# Patient Record
Sex: FEMALE | Race: WHITE | Employment: FULL TIME | URBAN - NONMETROPOLITAN AREA
[De-identification: names, ages, dates, MRNs, and addresses within clinical notes are randomized per-mention and may not be internally consistent; named-entity substitution may affect disease eponyms.]

---

## 2017-04-25 ENCOUNTER — OFFICE VISIT - GICH (OUTPATIENT)
Dept: FAMILY MEDICINE | Facility: OTHER | Age: 27
End: 2017-04-25

## 2017-04-25 ENCOUNTER — HISTORY (OUTPATIENT)
Dept: FAMILY MEDICINE | Facility: OTHER | Age: 27
End: 2017-04-25

## 2017-04-25 DIAGNOSIS — R35.0 FREQUENCY OF MICTURITION: ICD-10-CM

## 2017-04-25 DIAGNOSIS — N30.00 ACUTE CYSTITIS WITHOUT HEMATURIA: ICD-10-CM

## 2017-04-25 LAB
BACTERIA URINE: NORMAL BACTERIA/HPF
BILIRUB UR QL: NEGATIVE
CLARITY, URINE: CLEAR CLARITY
COLOR UR: YELLOW COLOR
EPITHELIAL CELLS: NORMAL EPI/HPF
GLUCOSE URINE: NEGATIVE MG/DL
KETONES UR QL: NEGATIVE MG/DL
LEUKOCYTE ESTERASE URINE: ABNORMAL
NITRITE UR QL STRIP: NEGATIVE
OCCULT BLOOD,URINE - HISTORICAL: ABNORMAL
PH UR: 5.5 [PH]
PROTEIN QUALITATIVE,URINE - HISTORICAL: NEGATIVE MG/DL
RBC - HISTORICAL: NORMAL /HPF
SP GR UR STRIP: 1.01
UROBILINOGEN,QUALITATIVE - HISTORICAL: NORMAL EU/DL
WBC - HISTORICAL: NORMAL /HPF

## 2017-04-28 LAB — CULTURE - HISTORICAL: NORMAL

## 2017-11-08 ENCOUNTER — COMMUNICATION - GICH (OUTPATIENT)
Dept: OBGYN | Facility: OTHER | Age: 27
End: 2017-11-08

## 2017-11-08 DIAGNOSIS — O36.80X0 PREGNANCY WITH INCONCLUSIVE FETAL VIABILITY: ICD-10-CM

## 2017-12-08 ENCOUNTER — AMBULATORY - GICH (OUTPATIENT)
Dept: OBGYN | Facility: OTHER | Age: 27
End: 2017-12-08

## 2017-12-08 ENCOUNTER — HISTORY (OUTPATIENT)
Dept: OBGYN | Facility: OTHER | Age: 27
End: 2017-12-08

## 2017-12-08 ENCOUNTER — HOSPITAL ENCOUNTER (OUTPATIENT)
Dept: RADIOLOGY | Facility: OTHER | Age: 27
End: 2017-12-08

## 2017-12-08 ENCOUNTER — PRENATAL OFFICE VISIT - GICH (OUTPATIENT)
Dept: OBGYN | Facility: OTHER | Age: 27
End: 2017-12-08

## 2017-12-08 DIAGNOSIS — O36.80X0 PREGNANCY WITH INCONCLUSIVE FETAL VIABILITY: ICD-10-CM

## 2017-12-08 DIAGNOSIS — Z34.02 ENCOUNTER FOR SUPERVISION OF NORMAL FIRST PREGNANCY IN SECOND TRIMESTER: ICD-10-CM

## 2017-12-28 NOTE — TELEPHONE ENCOUNTER
Patient Information     Patient Name MRN Romelia Lyman [id number] Female 1990      Telephone Encounter by Jocelin Iglesias RN at 2017  3:52 PM     Author:  Jocelin Iglesias RN Service:  (none) Author Type:  NURS- Registered Nurse     Filed:  2017  3:54 PM Encounter Date:  2017 Status:  Signed     :  Jocelin Iglesias RN (NURS- Registered Nurse)            Patient should be seen in soonest opening, labs and ultrasound are most reliable in early pregnancy. Imaging order placed.  Jocelin Iglesias RN............. 2017 3:53 PM

## 2017-12-28 NOTE — TELEPHONE ENCOUNTER
Patient Information     Patient Name MRN Romelia Lmyan [id number] Female 1990      Telephone Encounter by Ann Patel at 2017  3:44 PM     Author:  Ann Patel Service:  (none) Author Type:  (none)     Filed:  2017  3:46 PM Encounter Date:  2017 Status:  Signed     :  Ann Patel            Patient is scheduled for her initial ob physical with [facility] on 2017 at 11:15am.  Patient is currently 12 weeks pregnant.     Ann Patel ....................  2017   3:45 PM

## 2018-01-02 ENCOUNTER — COMMUNICATION - GICH (OUTPATIENT)
Dept: OBGYN | Facility: OTHER | Age: 28
End: 2018-01-02

## 2018-01-04 NOTE — PROGRESS NOTES
Patient Information     Patient Name MRN Sex Romelia OSMAN [id number] Female 1990      Progress Notes by Nadege Kelley NP at 2017  4:45 PM     Author:  Nadege Kelley NP Service:  (none) Author Type:  PHYS- Nurse Practitioner     Filed:  2017  5:18 PM Encounter Date:  2017 Status:  Signed     :  Nadege Kelley NP (PHYS- Nurse Practitioner)            HPI:    Romelia May is a 27 y.o. female who presents to clinic today for dysuria. She woke up this morning with burning after urination. No hematuria. She does have frequency and urgency. No fevers. Denies n/v or back pain. Took AZO for sx this morning. Hx of UTI's in past. Does not smoke. No vaginal discharge. Denies STD concerns.     No past medical history on file.  Past Surgical History:      Procedure  Laterality Date     WISDOM TEETH EXTRACTION  age 16    no anesthetic problems       Social History     Substance Use Topics       Smoking status: Never Smoker     Smokeless tobacco: Never Used     Alcohol use No     Current Outpatient Prescriptions       Medication  Sig Dispense Refill     albuterol HFA (PRO-AIR,VENTOLIN,PROVENTIL) 90 mcg/actuation inhaler Inhale 2 Puffs by mouth 4 times daily if needed for Wheezing. 1 Inhaler 0     NUVARING vaginal ring INSERT ONE RING VAGINALLY AND LEAVE IN PLACE FOR 3 CONSECUTIVE WEEKS, THEN REMOVE FOR ONE WEEK. REPEAT WITH NEW RING. 1 ring 0     No current facility-administered medications for this visit.      Medications have been reviewed by me and are current to the best of my knowledge and ability.    Allergies     Allergen  Reactions     Penicillins Rash       ROS:  Pertinent positives and negatives are noted in HPI.    EXAM:  General appearance: well appearing female, in no acute distress  Respiratory: clear to auscultation bilaterally  Cardiac: RRR with no murmurs  Abdomen: soft, nontender, no masses or organomegally, no CVA tenderness  Psychological: normal affect, alert and  pleasant  Lab:   Results for orders placed or performed in visit on 04/25/17      URINALYSIS W REFLEX MICROSCOPIC IF POSITIVE      Result  Value Ref Range    COLOR                     Yellow Yellow Color    CLARITY                   Clear Clear Clarity    SPECIFIC GRAVITY,URINE    1.010 1.010, 1.015, 1.020, 1.025                    PH,URINE                  5.5 6.0, 7.0, 8.0, 5.5, 6.5, 7.5, 8.5                    UROBILINOGEN,QUALITATIVE  Normal Normal EU/dl    PROTEIN, URINE Negative Negative mg/dL    GLUCOSE, URINE Negative Negative mg/dL    KETONES,URINE             Negative Negative mg/dL    BILIRUBIN,URINE           Negative Negative                    OCCULT BLOOD,URINE        Trace (A) Negative                    NITRITE                   Negative Negative                    LEUKOCYTE ESTERASE        Trace (A) Negative                   URINALYSIS MICROSCOPIC      Result  Value Ref Range    RBC 0-2 0-2, None Seen /HPF    WBC 3-5 0-2, 3-5, None Seen /HPF    BACTERIA                  None Seen None Seen, Rare, Occasional, Few Bacteria/HPF    EPITHELIAL CELLS          None Seen None Seen, Few Epi/HPF         ASSESSMENT/PLAN:    ICD-10-CM    1. Acute cystitis without hematuria N30.00 URINE CULTURE      trimethoprim-sulfamethoxazole, 160-800 mg, (BACTRIM DS, SEPTRA DS) tablet      URINE CULTURE   2. Frequency of urination R35.0 URINALYSIS W REFLEX MICROSCOPIC IF POSITIVE      URINALYSIS W REFLEX MICROSCOPIC IF POSITIVE      URINALYSIS MICROSCOPIC      URINALYSIS MICROSCOPIC   UA with trace blood, leukocytes. UC initiated. Tx empirically with abx pending UC. Reviewed need to complete all antibiotics. Discussed typical course of illness, symptomatic treatment and when to return to clinic. Patient in agreement with plan and all questions were answered.     Patient Instructions   Antibiotics per order.  Drink plenty of fluids.   Return to clinic if any fevers, vomiting, or flank pain develops as this may be a sign the  infection has moved to your kidney's.  We have initiated a urine culture. If any changes are needed in your antibiotics, we will notify you when the results have returned.

## 2018-01-04 NOTE — NURSING NOTE
Patient Information     Patient Name MRN Romelia Lyman [id number] Female 1990      Nursing Note by Kaylen Zabala at 2017  4:45 PM     Author:  Kaylen Zabala Service:  (none) Author Type:  (none)     Filed:  2017  4:51 PM Encounter Date:  2017 Status:  Signed     :  Kaylen Zabala            Patient presents to clinic with burning and frequency during urination.  Kaylen Ward ....................  2017   4:41 PM

## 2018-01-04 NOTE — PATIENT INSTRUCTIONS
Patient Information     Patient Name MRRomelia Meyer [id number] Female 1990      Patient Instructions by Nadege Kelley NP at 2017  4:45 PM     Author:  Nadege Kelley NP Service:  (none) Author Type:  PHYS- Nurse Practitioner     Filed:  2017  5:02 PM Encounter Date:  2017 Status:  Signed     :  Nadege Kelley NP (PHYS- Nurse Practitioner)            Antibiotics per order.  Drink plenty of fluids.   Return to clinic if any fevers, vomiting, or flank pain develops as this may be a sign the infection has moved to your kidney's.  We have initiated a urine culture. If any changes are needed in your antibiotics, we will notify you when the results have returned.

## 2018-01-09 ENCOUNTER — HISTORY (OUTPATIENT)
Dept: OBGYN | Facility: OTHER | Age: 28
End: 2018-01-09

## 2018-01-09 ENCOUNTER — PRENATAL OFFICE VISIT - GICH (OUTPATIENT)
Dept: OBGYN | Facility: OTHER | Age: 28
End: 2018-01-09

## 2018-01-09 ENCOUNTER — HOSPITAL ENCOUNTER (OUTPATIENT)
Dept: RADIOLOGY | Facility: OTHER | Age: 28
End: 2018-01-09

## 2018-01-09 DIAGNOSIS — Z34.02 ENCOUNTER FOR SUPERVISION OF NORMAL FIRST PREGNANCY IN SECOND TRIMESTER: ICD-10-CM

## 2018-01-12 ENCOUNTER — AMBULATORY - GICH (OUTPATIENT)
Dept: SCHEDULING | Facility: OTHER | Age: 28
End: 2018-01-12

## 2018-01-25 VITALS
WEIGHT: 122.4 LBS | DIASTOLIC BLOOD PRESSURE: 58 MMHG | SYSTOLIC BLOOD PRESSURE: 110 MMHG | HEART RATE: 56 BPM | BODY MASS INDEX: 20.9 KG/M2 | HEIGHT: 64 IN | TEMPERATURE: 97.8 F

## 2018-01-29 ENCOUNTER — DOCUMENTATION ONLY (OUTPATIENT)
Dept: FAMILY MEDICINE | Facility: OTHER | Age: 28
End: 2018-01-29

## 2018-01-29 RX ORDER — ALBUTEROL SULFATE 90 UG/1
2 AEROSOL, METERED RESPIRATORY (INHALATION) 4 TIMES DAILY PRN
COMMUNITY
Start: 2013-09-02

## 2018-01-30 ENCOUNTER — COMMUNICATION - GICH (OUTPATIENT)
Dept: OBGYN | Facility: OTHER | Age: 28
End: 2018-01-30

## 2018-01-31 ENCOUNTER — AMBULATORY - GICH (OUTPATIENT)
Dept: SCHEDULING | Facility: OTHER | Age: 28
End: 2018-01-31

## 2018-02-08 ENCOUNTER — HISTORY (OUTPATIENT)
Dept: OBGYN | Facility: OTHER | Age: 28
End: 2018-02-08

## 2018-02-08 ENCOUNTER — PRENATAL OFFICE VISIT - GICH (OUTPATIENT)
Dept: OBGYN | Facility: OTHER | Age: 28
End: 2018-02-08

## 2018-02-08 DIAGNOSIS — Z34.02 ENCOUNTER FOR SUPERVISION OF NORMAL FIRST PREGNANCY IN SECOND TRIMESTER: ICD-10-CM

## 2018-02-09 VITALS
WEIGHT: 137 LBS | DIASTOLIC BLOOD PRESSURE: 50 MMHG | HEART RATE: 80 BPM | BODY MASS INDEX: 23.52 KG/M2 | SYSTOLIC BLOOD PRESSURE: 96 MMHG

## 2018-02-09 VITALS
DIASTOLIC BLOOD PRESSURE: 70 MMHG | WEIGHT: 128.5 LBS | BODY MASS INDEX: 22.06 KG/M2 | SYSTOLIC BLOOD PRESSURE: 102 MMHG | HEART RATE: 88 BPM

## 2018-02-09 VITALS
HEART RATE: 60 BPM | DIASTOLIC BLOOD PRESSURE: 58 MMHG | SYSTOLIC BLOOD PRESSURE: 102 MMHG | BODY MASS INDEX: 21.83 KG/M2 | WEIGHT: 127.2 LBS

## 2018-02-12 NOTE — NURSING NOTE
Patient Information     Patient Name MRRomelia Meyer [id number] Female 1990      Nursing Note by Екатерина Kelley at 2017 11:15 AM     Author:  Екатерина Kelley Service:  (none) Author Type:  (none)     Filed:  2017 12:01 PM Encounter Date:  2017 Status:  Signed     :  Екатерина Kelley              MnVFC Eligibility Criteria  ( 0 to 18 Years of age ):      __ Uninsured: Does not have insurance    __ [place] (MHCP) enrollee: [place] [facility] ,[place], or a Prepaid Medical Assistance Program (PMAP)               __  or [place] Native      __ Insured: Has insurance that covers the cost of all vaccines (NOT MNVFC ELIGIBLE BECAUSE INSURANCE ALREADY COVERS VACCINES)         _X_ Has insurance that does not cover vaccines until a deductible has been met. (NOT MNVFC ELIGIBLE AT THIS PRIVATE CLINIC. NEEDS TO GO TO PUBLIC HEALTH.)                       __ Underinsured:         Has health insurance that does not cover one or more vaccines.         Has health insurance that caps prevention services at a certain amount.        (NOT MNVFC ELIGIBLE AT THIS PRIVATE CLINIC.  NEEDS TO GO TO PUBLIC HEALTH.)               Children that are underinsured are only able to receive MnVFC vaccines at local [facility] clinics ([facility]), [facility] ([facility]), [facility] ([facility]), [facility] Service clinics (S), and [facility] clinics. Please let patients know that if immunizations are not covered by their insurance, they could receive a bill for immunizations given at private clinic sites.    Eligibility reviewed and immunization(s) administered by:  Dana Thao LPN.................2017

## 2018-02-12 NOTE — NURSING NOTE
Patient Information     Patient Name MRN Romelia Lyman [id number] Female 1990      Nursing Note by Barb Storm at 2018  1:30 PM     Author:  Barb Storm Service:  (none) Author Type:  (none)     Filed:  2018  1:46 PM Encounter Date:  2018 Status:  Signed     :  Barb Storm            2 Babystep coupons given.  Barb Storm LPN  2018  1:46 PM

## 2018-02-12 NOTE — TELEPHONE ENCOUNTER
"Patient Information     Patient Name MRRomelia Meyer [id number] Female 1990      Telephone Encounter by Shirlene Domínguez RN at 2018  3:44 PM     Author:  Shirlene Domínguez RN Service:  (none) Author Type:  NURS- Registered Nurse     Filed:  2018  4:07 PM Encounter Date:  2018 Status:  Signed     :  Shirlene Domínguez RN (NURS- Registered Nurse)            Patient calls to report pink spotting on  after intercourse. This lasted about 30 minutes and did not saturate a \"panty liner.\" Has noted today some mild cramping and upper back pain. Denies recent injury, fever, and trouble with urination. No further vaginal bleeding, no change in discharge, no leaking of fluid.     Patient will continue to monitor at home and call back or present for evaluation if symptoms persist or worsen. She has routine appointment scheduled for 18.  Shirlene Domínguez RN ....................  2018   4:07 PM      Reason for Disposition    Mild abdominal pain (all triage questions negative)    SPOTTING after sexual intercourse (single or brief episode) (all triage questions negative)    Answer Assessment - Initial Assessment Questions  1. ONSET: \"When did this bleeding start?\"          2. DESCRIPTION: \"Describe the bleeding that you are having.\" \"How much bleeding is there?\"     - SPOTTING: spotting, or pinkish / brownish mucous discharge; does not fill panti-liner or pad     - MILD:  less than 1 pad / hour; less than patient's usual menstrual bleeding    - MODERATE: 1-2 pads / hour; small-medium blood clots (e.g., pea, grape, small coin)     - SEVERE: soaking 2 or more pads/hour for 2 or more hours; bleeding not contained by pads or continuous red blood from vagina; large blood clots (e.g., golf ball, large coin)       Pink spotting, did not saturate panty liner  3. ABDOMINAL PAIN SEVERITY: If present, ask: \"How bad is it?\"  (e.g., Scale 1-10; mild, moderate, or severe)    " "- MILD (1-3): doesn't interfere with normal activities, abdomen soft and not tender to touch     - MODERATE (4-7): interferes with normal activities or awakens from sleep, tender to touch     - SEVERE (8-10): excruciating pain, doubled over, unable to do any normal activities      Mild cramping  4. PREGNANCY: \"Do you know how many weeks or months pregnant you are?\" \"When was the first day of your last normal menstrual period?\"      19w2d  5. HEMODYNAMIC STATUS: \"Are you weak or feeling lightheaded?\" If so, ask: \"Can you stand and walk normally?\"       No concerns  6. OTHER SYMPTOMS: \"What other symptoms are you having with the bleeding?\" (e.g., passed tissue, vaginal discharge, fever, menstrual-type cramps)      Mild cramping and upper back pain    Protocols used: ADULT PREGNANCY - ABDOMINAL PAIN LESS THAN 20 WEEKS EGA-A-AH, ADULT PREGNANCY - VAGINAL BLEEDING LESS THAN 20 WEEKS EGA-A-AH            "

## 2018-02-12 NOTE — PROGRESS NOTES
Patient Information     Patient Name MRN Romelia Lyman [id number] Female 1990      Progress Notes by Maame Rasmussen at 2017 10:05 AM     Author:  Maame Rasmussen Service:  (none) Author Type:  Other Clinical Staff     Filed:  2017 10:26 AM Date of Service:  2017 10:05 AM Status:  Signed     :  Maame Rasmussen (Other Clinical Staff)            Falls Risk Criteria:    Age 65 and older or under age 4        Sensory deficits    Poor vision    Use of ambulatory aides    Impaired judgment    Unable to walk independently    Meets High Risk criteria for falls:  No

## 2018-02-12 NOTE — PROGRESS NOTES
Patient Information     Patient Name MRN Sex     Romelia KATZ [id number] Female 1990      Progress Notes by Shirley Pardo MD at 2017 11:15 AM     Author:  Shirley Pardo MD Service:  (none) Author Type:  Physician     Filed:  2017 12:21 PM Encounter Date:  2017 Status:  Signed     :  Shirley Pardo MD (Physician)            INITIAL OB VISIT    HPI  Romelia KATZ is a 27 y.o. female  at 15w5d by 15w5d US who presents for first OB visit. This pregnancy was planned. She established care at [facility] where her original primary OB provider was, however, has decided to transfer to [facility] since she lives in [place] and it is more convenient. She is feeling better after the first trimester. Had nausea, no emesis, now resolved. Denies cramping or bleeding.    MENSTRUAL HISTORY  LMP:Patient's last menstrual period was 2017 (exact date).  Definite:  Yes  Menses monthly:  Yes  On contraception at conception:  No- had just stopped Nuvaring and became pregnant first cycle  Hx of LEEP 2014, normal paps since    PAST PREGNANCIES  OB History                    Para  Term     AB  Living     1                 SAB   TAB  Ectopic  Multiple   Live Births                                         # Outcome Date  GA  Lbr Dewey/2nd  Weight Sex  Delivery Anes PTL Lv   1 Current                                     PAST MEDICAL/SURGICAL HISTORY  PMH: negative  PSH:  wisdom teeth    Current Outpatient Prescriptions       Medication  Sig Dispense Refill     albuterol HFA (PRO-AIR,VENTOLIN,PROVENTIL) 90 mcg/actuation inhaler Inhale 2 Puffs by mouth 4 times daily if needed for Wheezing. 1 Inhaler 0     Prenatal Multivit-Ca-Min-Fe-FA (PRENATAL VITAMIN) tab tablet Take 1 tablet by mouth once daily.  0     No current facility-administered medications for this visit.      Medications have been reviewed by me and are current to the best of my knowledge and  ability.    Allergies: Penicillins- hives, denies respiratory distress    SOCIAL HISTORY  Tobacco:  No  Alcohol:  No  Drugs:  No  , lives with her  Christiano. Works at [employer].     FamH: negative. Denies hx of birth defects or learning disorders    GENETIC SCREENING:  Maternal pertinent postives: No  Paternal pertinent positives: No    INFECTION HISTORY  Patient or partner with hx HSV:No  Rash or viral illness since LMP:No  Hepatitis B or C: No  STD (GC, Chlamydia, HPV): HPV    ROS: see HPI, complete ROS otherwise negative    PHYSICAL EXAM  /58  Pulse 60  Wt 57.7 kg (127 lb 3.2 oz)  LMP 2017 (Exact Date)  BMI 21.83 kg/m2   General: Pleasant WN female, A and O x3, NAD  HEENT : Grossly normal  Neck: Thyroid normal size, with no nodules, no adenopathy  Lungs: Clear, good AE, no rales or rhonchi  Heart: RRR no murmur , NL S1 and S2  Abdomen: Soft NT, ND, no masses, normal BS  Ext: No edema    Pelvic:  Deferred- done at original new OB    Fundal Height: 3cm below umbilicus  FHT: 145    IMPRESSION   IUP at 15w5d weeks by 15w5d US here for new OB. Upon discussion with patient- had an ultrasound done in October and thinks the assigned due date was 18 but had a second ultrasound in November and was told her due date was changed to 18. PETER signed to review US reports. Will redate based on earliest US since she had been on hormonal contraceptives within 3 months of conceiving.     Risk factors identified: none  High risk pregnancy: No  Repeat C/S planned: No      PLAN:  Discussed genetic testing available: Yes- declined  1st trimester US ordered/completed: Yes  Anesthesia consult needed: No  OB/Gyn or MFM referral: No    PETER signed to obtain new OB labs. Per card given to patient from her clinic- A positive, RI, GC/Chlam neg, HIV NR, HepB NR, VDRL NR, urine neg  Flu vaccine 2017   Prenatal vitamin daily  Routine anticipatory guidance  Return to office in four weeks for follow up  or sooner prn.  Questions answered.    Shirley Pardo MD  OB/GYN  12/8/2017 12:13 PM

## 2018-02-12 NOTE — PROGRESS NOTES
Patient Information     Patient Name MRN Sex Romelia OSMAN [id number] Female 1990      Progress Notes by Shirley Pardo MD at 2018  1:30 PM     Author:  Shirley Pardo MD Service:  (none) Author Type:  Physician     Filed:  2018  2:17 PM Encounter Date:  2018 Status:  Signed     :  Shirley Pardo MD (Physician)            [facility]  Return OB Visit    S: Patient reports she has been feeling well. Has occasional cramps, no regular pattern. Had spotting after intercourse last week, resolved after about an hour. She denies leaking fluid. She reports normal fetal movement. She also reports some hip pain. Not using a maternity belt    O: /70 (Cuff Site: Right Arm, Position: Sitting, Cuff Size: Adult Regular)  Pulse 88  Wt 58.3 kg (128 lb 8 oz)  LMP 2017 (Exact Date)  BMI 22.06 kg/m2  Gen: Well-appearing, NAD    Fundal Height:  21  FHR: 155    A/P:  Romelia KATZ is a 28 y.o.  at 20w2d by 15w5d US, here for return OB visit.  New OB labs still not sent. Patient to resign PETER today for [facility]   Plans breastfeeding    PNC:   - Prenatal labs at OSH. If not sent by 28 week appt, will redraw  - Genetics: declines  - Imaging: original dating US not available, awaiting PETER  - Immunizations: flu 17  RTC 4 weeks    Shirley Pardo MD  OB/GYN  2018 2:13 PM

## 2018-02-13 NOTE — TELEPHONE ENCOUNTER
Patient Information     Patient Name MRN Romelia Lyman [id number] Female 1990      Telephone Encounter by Jocelin Iglesias RN at 2018  3:35 PM     Author:  Jocelin Iglesias RN Service:  (none) Author Type:  NURS- Registered Nurse     Filed:  2018  3:36 PM Encounter Date:  2018 Status:  Signed     :  Jocelin Iglesias RN (NURS- Registered Nurse)            Patient notified - was happy to accept ultrasound. Patient will come for OB appointment at 1 pm, ultrasound at 130. Patient and ultrasound notified of this.  Jocelin Iglesias RN............. 2018 3:36 PM

## 2018-02-13 NOTE — PROGRESS NOTES
Patient Information     Patient Name MRN Romelia Lyman [id number] Female 1990      Progress Notes by Shirley Pardo MD at 2018  1:15 PM     Author:  Shirley Pardo MD Service:  (none) Author Type:  Physician     Filed:  2018  1:47 PM Encounter Date:  2018 Status:  Signed     :  Shirley Pardo MD (Physician)            [facility]  Return OB Visit    S: Patient reports she has been feeling okay. Has been having more right sided rib pain over the last few weeks. She had similar pain during dance in high school. She saw a chiropractor after last appt, which helped but pain has returned. It is exacerbated by wearing bras. Wondering what options are safe. She denies cramping, contractions, vaginal bleeding, leaking fluid. She reports normal fetal movement. She has no other complaints.    O: BP 96/50 (Cuff Site: Right Arm, Position: Sitting, Cuff Size: Adult Regular)  Pulse 80  Wt 62.1 kg (137 lb)  LMP 2017 (Exact Date)  BMI 23.52 kg/m2  Gen: Well-appearing, NAD    Fundal Height:  24  FHR: 160    A/P:  Romelia KATZ is a 28 y.o.  at 23w6d by 7w0d US, here for return OB visit.  Rib pain: discussed heat and tylenol. Offered referral to [facility] to keep seeing chiropractor.   Plans breastfeeding     PNC:   - Prenatal labs: A positive, RI, HIV NR, treponema NR, HepBsAg NR (scanned records)  - Genetics: declines  - Imaging: dating US 7w0d (scanned records), normal anatomy  - Immunizations: flu 17  RTC 4 weeks- GCT, CBC, syphilis, Tdap next visit    Shirley Pardo MD  OB/GYN  2018 1:42 PM

## 2018-02-13 NOTE — PROGRESS NOTES
Patient Information     Patient Name MRN Romelia Lyman [id number] Female 1990      Progress Notes by Dayanara Bryson R.T. ([facility]) at 2018  3:57 PM     Author:  Dayanara Bry[facility] R.T. ([facility] Service:  (none) Author Type:  [employer] - Registered Radiologic Technologist     Filed:  2018  3:57 PM Date of Service:  2018  3:57 PM Status:  Signed     :  Dayanara Bryson R.T. (AR[facility]) ([facility] - Registered Radiologic Technologist)            Falls Risk Criteria:    Age 65 and older or under age 4        Sensory deficits    Poor vision    Use of ambulatory aides    Impaired judgment    Unable to walk independently    Meets High Risk criteria for falls:  no

## 2018-02-13 NOTE — TELEPHONE ENCOUNTER
Patient Information     Patient Name MRN Romelia Lyman [id number] Female 1990      Telephone Encounter by Jocelin Iglesias RN at 2018  1:51 PM     Author:  Jocelin Iglesias RN Service:  (none) Author Type:  NURS- Registered Nurse     Filed:  2018  1:56 PM Encounter Date:  2018 Status:  Signed     :  Jocelin Iglesias RN (NURS- Registered Nurse)            Ultrasound technicians would like to offer this patient a free level II ultrasound after her appointment on 2018. New machines were purchased and volunteers are needed around the 20 week gestation nina. Left message to call back  ....................Jocelin Iglesias RN  2018   1:55 PM

## 2018-03-07 ENCOUNTER — OFFICE VISIT (OUTPATIENT)
Dept: OBGYN | Facility: OTHER | Age: 28
End: 2018-03-07
Attending: OBSTETRICS & GYNECOLOGY
Payer: COMMERCIAL

## 2018-03-07 VITALS
SYSTOLIC BLOOD PRESSURE: 102 MMHG | BODY MASS INDEX: 23.1 KG/M2 | HEART RATE: 80 BPM | DIASTOLIC BLOOD PRESSURE: 60 MMHG | WEIGHT: 134.56 LBS

## 2018-03-07 DIAGNOSIS — Z34.02 ENCOUNTER FOR SUPERVISION OF NORMAL FIRST PREGNANCY IN SECOND TRIMESTER: Primary | ICD-10-CM

## 2018-03-07 LAB
ERYTHROCYTE [DISTWIDTH] IN BLOOD BY AUTOMATED COUNT: 12.4 % (ref 10–15)
GLUCOSE 1H P 50 G GLC PO SERPL-MCNC: 96 MG/DL (ref 60–129)
HCT VFR BLD AUTO: 33.1 % (ref 35–47)
HGB BLD-MCNC: 11.4 G/DL (ref 11.7–15.7)
MCH RBC QN AUTO: 31.1 PG (ref 26.5–33)
MCHC RBC AUTO-ENTMCNC: 34.4 G/DL (ref 31.5–36.5)
MCV RBC AUTO: 90 FL (ref 78–100)
PLATELET # BLD AUTO: 177 10E9/L (ref 150–450)
RBC # BLD AUTO: 3.67 10E12/L (ref 3.8–5.2)
WBC # BLD AUTO: 9.1 10E9/L (ref 4–11)

## 2018-03-07 PROCEDURE — 99207 ZZC OB VISIT-NO CHARGE - GICH ONLY: CPT | Performed by: OBSTETRICS & GYNECOLOGY

## 2018-03-07 PROCEDURE — 36415 COLL VENOUS BLD VENIPUNCTURE: CPT | Performed by: OBSTETRICS & GYNECOLOGY

## 2018-03-07 PROCEDURE — 90715 TDAP VACCINE 7 YRS/> IM: CPT | Performed by: OBSTETRICS & GYNECOLOGY

## 2018-03-07 PROCEDURE — 86780 TREPONEMA PALLIDUM: CPT | Performed by: OBSTETRICS & GYNECOLOGY

## 2018-03-07 PROCEDURE — 85027 COMPLETE CBC AUTOMATED: CPT | Performed by: OBSTETRICS & GYNECOLOGY

## 2018-03-07 PROCEDURE — 82950 GLUCOSE TEST: CPT | Performed by: OBSTETRICS & GYNECOLOGY

## 2018-03-07 PROCEDURE — 90471 IMMUNIZATION ADMIN: CPT | Performed by: OBSTETRICS & GYNECOLOGY

## 2018-03-07 ASSESSMENT — PAIN SCALES - GENERAL: PAINLEVEL: NO PAIN (0)

## 2018-03-07 NOTE — NURSING NOTE
Patient presents today for her prenatal check-up. She is currently 27w5d.  Barb Storm LPN  3/7/2018  3:32 PM

## 2018-03-07 NOTE — LETTER
3/7/2018       RE: Romelia Bhandari  [address]  [facility] [address]     Dear Colleague,    Thank you for referring your patient, Romelia Bhandari, to the [facility]. Please see a copy of my visit note below.    Return OB Visit    S: Patient has been feeling well. Biggest complaint is rib pressure. No ctx, VB, LOF. +FM.     O: /60 (BP Location: Right arm, Patient Position: Sitting, Cuff Size: Adult Regular)  Pulse 80  Wt 61 kg (134 lb 9 oz)  BMI 23.1 kg/m2  Gen: Well-appearing, NAD    FHT: 150  FH: 27cm    A/P:  Romelia Bhandari is a 28 year old  at 27w5d by 7w0d US, here for return OB visit.  Plans breastfeeding      PNC:   - Prenatal labs: A positive, RI, HIV NR, treponema NR, HepBsAg NR (scanned records). GCT, CBC, syphilis 3/7/2018   - Genetics: declines  - Imaging: dating US 7w0d (scanned records), normal anatomy  - Immunizations: flu 17. Tdap 3/7/2018   RTC 4 weeks    Shirley Pardo MD  OB/GYN  3/7/2018 3:43 PM        Again, thank you for allowing me to participate in the care of your patient.      Sincerely,    Shirley Pardo MD

## 2018-03-07 NOTE — PROGRESS NOTES
Return OB Visit    S: Patient has been feeling well. Biggest complaint is rib pressure. No ctx, VB, LOF. +FM.     O: /60 (BP Location: Right arm, Patient Position: Sitting, Cuff Size: Adult Regular)  Pulse 80  Wt 61 kg (134 lb 9 oz)  BMI 23.1 kg/m2  Gen: Well-appearing, NAD    FHT: 150  FH: 27cm    A/P:  Romelia Bhandari is a 28 year old  at 27w5d by 7w0d US, here for return OB visit.  Plans breastfeeding      PNC:   - Prenatal labs: A positive, RI, HIV NR, treponema NR, HepBsAg NR (scanned records). GCT, CBC, syphilis 3/7/2018   - Genetics: declines  - Imaging: dating US 7w0d (scanned records), normal anatomy  - Immunizations: flu 17. Tdap 3/7/2018   RTC 4 weeks    Shirley Pardo MD  OB/GYN  3/7/2018 3:43 PM

## 2018-03-07 NOTE — MR AVS SNAPSHOT
After Visit Summary   3/7/2018    Romelia Bhandari    MRN: [id number]           Patient Information     Date Of Birth          1990        Visit Information        Provider Department      3/7/2018 3:15 PM Shirley Pardo MD [facility]        Today's Diagnoses     Encounter for supervision of normal first pregnancy in second trimester    -  1       Follow-ups after your visit        Your next 10 appointments already scheduled     Apr 06, 2018  9:15 AM CDT   ESTABLISHED PRENATAL with Shirley Pardo MD   [facility]    [address]  [place] [address]   [phone]            Apr 20, 2018  9:30 AM CDT   ESTABLISHED PRENATAL with Shirley Pardo MD   [facility]    [address]  [place] [address]   [phone]            May 04, 2018  9:30 AM CDT   ESTABLISHED PRENATAL with Shirley Pardo MD   [facility]    [address]  [place] [address]   [phone]            May 11, 2018  9:30 AM CDT   ESTABLISHED PRENATAL with Shirley Pardo MD   [facility]    [address]  [place] [address]   [phone]            May 18, 2018  9:30 AM CDT   ESTABLISHED PRENATAL with Shirley Pardo MD   [facility] ([facility])    [address]  [place] [address]   [phone]            May 25, 2018  9:30 AM CDT   ESTABLISHED PRENATAL with Shirley Pardo MD   [facility]    [address]  [place] [address]   [phone]            Jun 01, 2018  9:30 AM CDT   ESTABLISHED PRENATAL with Shirley Pardo MD   [facility] ([facility]    [address]  [place] [id number] [phone]              Who to contact     If you have questions or need follow up information about today's clinic visit or your schedule please contact [facility] directly at [phone].  Normal or non-critical lab and imaging results will be communicated to you by MyChart, letter or phone within 4 business days after the clinic has received the results. If you do not hear from us within 7 days, please contact the clinic through [employer] or phone. If you have a critical or abnormal lab result, we will notify you by phone as soon as possible.  Submit refill requests through [employer] or call your pharmacy and they will forward the refill request to us. Please allow 3 business days for your refill to be completed.          Additional Information About Your Visit        MyChart Information     [employer] lets you send messages to your doctor, view your test results, renew your prescriptions, schedule appointments and more. To sign up, go to [email or web address] . Click on \"Log in\" on the left side of the screen, which will take you to the Welcome page. Then click on \"Sign up Now\" on the right side of the page.     You will be asked to enter the access code listed below, as well as some personal information. Please follow the directions to create your username and password.     Your access code is: [email or web address]  Expires: 2018  4:10 PM     Your access code will  in 90 days. If you need help or a new code, please call your [place] clinic or [phone].        Care EveryWhere ID     This is your Care EveryWhere ID. This could be used by other organizations to access your [facility] medical records  [id number]        Your Vitals Were     Pulse BMI (Body Mass Index)                80 23.1 kg/m2           Blood Pressure from Last 3 Encounters:   18 102/60   18 96/50   18 102/70    Weight from Last 3 " Encounters:   03/07/18 61 kg (134 lb 9 oz)   02/08/18 62.1 kg (137 lb)   01/09/18 58.3 kg (128 lb 8 oz)              We Performed the Following     Anti Treponema     CBC with platelets     Glucose tolerance, gest screen, 1 hour     TDAP VACCINE (BOOSTRIX)        Primary Care Provider Office Phone # Fax #    Dennise Marshall [phone]       [facility] [address] [place] [address]        Equal Access to Services     AWA HERNANDEZ : Hadii aad ku hadasho Soomaali, waaxda luqadaha, qaybta kaalmada adeegyada, waxay idiin hayaan melony catalan . So [facility] [phone].    ATENCIÓN: Si habla español, tiene a herrera disposición servicios gratuitos de asistencia lingüística. [facility] [phone].    We comply with applicable federal civil rights laws and [place] laws. We do not discriminate on the basis of race, color, national origin, age, disability, sex, sexual orientation, or gender identity.            Thank you!     Thank you for choosing [facility] \[facility]\""  for your care. Our goal is always to provide you with excellent care. Hearing back from our patients is one way we can continue to improve our services. Please take a few minutes to complete the written survey that you may receive in the mail after your visit with us. Thank you!             Your Updated Medication List - Protect others around you: Learn how to safely use, store and throw away your medicines at www.disposemymeds.org.          This list is accurate as of 3/7/18  4:10 PM.  Always use your most recent med list.                   Brand Name Dispense Instructions for use Diagnosis    albuterol 108 (90 BASE) MCG/ACT Inhaler    PROAIR HFA/PROVENTIL HFA/VENTOLIN HFA     Inhale 2 puffs into the lungs 4 times daily as needed for wheezing        CVS PRENATAL 28-0.8 MG Tabs      Take 1 tablet by mouth daily

## 2018-03-09 LAB — T PALLIDUM IGG+IGM SER QL: NEGATIVE

## 2018-04-06 ENCOUNTER — PRENATAL OFFICE VISIT (OUTPATIENT)
Dept: OBGYN | Facility: OTHER | Age: 28
End: 2018-04-06
Attending: OBSTETRICS & GYNECOLOGY
Payer: COMMERCIAL

## 2018-04-06 VITALS
WEIGHT: 136.9 LBS | BODY MASS INDEX: 23.5 KG/M2 | SYSTOLIC BLOOD PRESSURE: 98 MMHG | HEART RATE: 78 BPM | DIASTOLIC BLOOD PRESSURE: 60 MMHG

## 2018-04-06 DIAGNOSIS — Z34.03 ENCOUNTER FOR SUPERVISION OF NORMAL FIRST PREGNANCY IN THIRD TRIMESTER: Primary | ICD-10-CM

## 2018-04-06 PROCEDURE — 99207 ZZC OB VISIT-NO CHARGE - GICH ONLY: CPT | Performed by: OBSTETRICS & GYNECOLOGY

## 2018-04-06 RX ORDER — BREAST PUMP
EACH MISCELLANEOUS
Qty: 1 EACH | Refills: 0 | Status: SHIPPED | OUTPATIENT
Start: 2018-04-06 | End: 2018-06-29

## 2018-04-06 NOTE — MR AVS SNAPSHOT
After Visit Summary   4/6/2018    Romelia Bhandari    MRN: [id number]           Patient Information     Date Of Birth          1990        Visit Information        Provider Department      4/6/2018 9:15 AM Shirley Pardo MD [facility]        Today's Diagnoses     Encounter for supervision of normal first pregnancy in third trimester    -  1       Follow-ups after your visit        Your next 10 appointments already scheduled     Apr 20, 2018  9:30 AM CDT   ESTABLISHED PRENATAL with Shirley Pardo MD   [facility] ([facility])    [address]  [place] [address]   [phone]            May 04, 2018  9:30 AM CDT   ESTABLISHED PRENATAL with Shirley Pardo MD   [facility] ([facility])    [address]  [place] [address]   [phone]            May 11, 2018  9:30 AM CDT   ESTABLISHED PRENATAL with Shirley Pardo MD   [facility] ([facility])    [address]  [place] [address]   [phone]            May 18, 2018  9:30 AM CDT   ESTABLISHED PRENATAL with Shirley Pardo MD   [facility] ([facility])    [address]  [place] [address]   [phone]            May 25, 2018  9:30 AM CDT   ESTABLISHED PRENATAL with Shirley Pardo MD   [facility] ([facility])    [address]  [place] [address]   [phone]            Jun 01, 2018  9:30 AM CDT   ESTABLISHED PRENATAL with Shirley Pardo MD   [facility] ([facility])    [address]  [place] [address]   [phone]              Who to contact     If you have questions or need follow up information about today's clinic visit or your schedule please contact  "[facility] directly at [phone].  Normal or non-critical lab and imaging results will be communicated to you by MyChart, letter or phone within 4 business days after the clinic has received the results. If you do not hear from us within 7 days, please contact the clinic through [employer] or phone. If you have a critical or abnormal lab result, we will notify you by phone as soon as possible.  Submit refill requests through [employer] or call your pharmacy and they will forward the refill request to us. Please allow 3 business days for your refill to be completed.          Additional Information About Your Visit        [employer] Information     [employer] lets you send messages to your doctor, view your test results, renew your prescriptions, schedule appointments and more. To sign up, go to [email or web address] . Click on \"Log in\" on the left side of the screen, which will take you to the Welcome page. Then click on \"Sign up Now\" on the right side of the page.     You will be asked to enter the access code listed below, as well as some personal information. Please follow the directions to create your username and password.     Your access code is: [email or web address]  Expires: 2018  5:10 PM     Your access code will  in 90 days. If you need help or a new code, please call your [facility] clinic or [phone].        Care EveryWhere ID     This is your Care EveryWhere ID. This could be used by other organizations to access your [facility] medical records  [id number]        Your Vitals Were     Pulse BMI (Body Mass Index)                78 23.5 kg/m2           Blood Pressure from Last 3 Encounters:   18 98/60   18 102/60   18 96/50    Weight from Last 3 Encounters:   18 62.1 kg (136 lb 14.4 oz)   18 61 kg (134 lb 9 oz)   18 62.1 kg (137 lb)              Today, you had the following     No orders found for display         Today's Medication Changes        "   These changes are accurate as of 4/6/18 12:54 PM.  If you have any questions, ask your nurse or doctor.               Start taking these medicines.        Dose/Directions    breast pump Misc   Used for:  Encounter for supervision of normal first pregnancy in third trimester   Started by:  Shirley Pardo MD        Reason for need: Breastfeeding. Length of need: 1 year   Quantity:  1 each   Refills:  0            Where to get your medicines      Some of these will need a paper prescription and others can be bought over the counter.  Ask your nurse if you have questions.     Bring a paper prescription for each of these medications     breast pump Misc                Primary Care Provider Office Phone # Fax #    Dennise Marshall [phone]       [facility] [address] [facility] [address]        Equal Access to Services     AWA HERNANDEZ : Hadii jeremías casey hadasho Soomaali, waaxda luqadaha, qaybta kaalmada adeegyada, waxlei catalan . So [facility] [phone].    ATENCIÓN: Si habla español, tiene a herrera disposición servicios gratuitos de asistencia lingüística. [facility] [phone].    We comply with applicable federal civil rights laws and [place] laws. We do not discriminate on the basis of race, color, national origin, age, disability, sex, sexual orientation, or gender identity.            Thank you!     Thank you for choosing [facility]  for your care. Our goal is always to provide you with excellent care. Hearing back from our patients is one way we can continue to improve our services. Please take a few minutes to complete the written survey that you may receive in the mail after your visit with us. Thank you!             Your Updated Medication List - Protect others around you: Learn how to safely use, store and throw away your medicines at www.disposemymeds.org.          This list is accurate as of 4/6/18 12:54 PM.  Always use  your most recent med list.                   Brand Name Dispense Instructions for use Diagnosis    albuterol 108 (90 BASE) MCG/ACT Inhaler    PROAIR HFA/PROVENTIL HFA/VENTOLIN HFA     Inhale 2 puffs into the lungs 4 times daily as needed for wheezing        breast pump Misc     1 each    Reason for need: Breastfeeding. Length of need: 1 year    Encounter for supervision of normal first pregnancy in third trimester       CVS PRENATAL 28-0.8 MG Tabs      Take 1 tablet by mouth daily

## 2018-04-06 NOTE — PROGRESS NOTES
Return OB Visit    S: Patient reports she is feeling well. Occasional BH ctx. No VB or LOF. +FM    O: BP 98/60 (BP Location: Right arm, Patient Position: Sitting, Cuff Size: Adult Regular)  Pulse 78  Wt 62.1 kg (136 lb 14.4 oz)  BMI 23.5 kg/m2  Gen: Well-appearing, NAD  See OB Flowsheet    A/P:  Romelia Bhandari is a 28 year old  at 32w0d  by 7w0d US, here for return OB visit.  Plans breastfeeding- breast pump Rx 2018   Epidural  Mirena      PNC:   - Prenatal labs: A positive, RI, HIV NR, treponema NR, HepBsAg NR (scanned records). GCT, CBC, syphilis 3/7/2018   - Genetics: declines  - Imaging: dating US 7w0d (scanned records), normal anatomy  - Immunizations: flu 17. Tdap 3/7/2018   RTC 2 weeks    Shirley Pardo MD  OB/GYN  2018 12:53 PM

## 2018-04-06 NOTE — NURSING NOTE
Patient states she has been having a lot more pressure than before.  Bessie Moreno............................... 4/6/2018 9:26 AM

## 2018-04-20 ENCOUNTER — PRENATAL OFFICE VISIT (OUTPATIENT)
Dept: OBGYN | Facility: OTHER | Age: 28
End: 2018-04-20
Attending: OBSTETRICS & GYNECOLOGY
Payer: COMMERCIAL

## 2018-04-20 VITALS
BODY MASS INDEX: 23.72 KG/M2 | WEIGHT: 138.19 LBS | SYSTOLIC BLOOD PRESSURE: 104 MMHG | HEART RATE: 84 BPM | DIASTOLIC BLOOD PRESSURE: 66 MMHG

## 2018-04-20 DIAGNOSIS — Z34.03 ENCOUNTER FOR SUPERVISION OF NORMAL FIRST PREGNANCY IN THIRD TRIMESTER: Primary | ICD-10-CM

## 2018-04-20 PROCEDURE — 99207 ZZC OB VISIT-NO CHARGE - GICH ONLY: CPT | Performed by: OBSTETRICS & GYNECOLOGY

## 2018-04-20 ASSESSMENT — PAIN SCALES - GENERAL: PAINLEVEL: NO PAIN (0)

## 2018-04-20 NOTE — MR AVS SNAPSHOT
After Visit Summary   4/20/2018    Romelia Bhandari    MRN: [id number]           Patient Information     Date Of Birth          1990        Visit Information        Provider Department      4/20/2018 9:30 AM Shirley Pardo MD [facility] [place] [facility]        Today's Diagnoses     Encounter for supervision of normal first pregnancy in third trimester    -  1       Follow-ups after your visit        Your next 10 appointments already scheduled     May 04, 2018  9:30 AM CDT   ESTABLISHED PRENATAL with Shirley Pardo MD   [facility] [place] [facility] ([facility])    [address]  [place] [address]   [phone]            May 11, 2018  9:30 AM CDT   ESTABLISHED PRENATAL with Shirley Pardo MD   [facility] ([facility])    [address]  [place] [address]   [phone]            May 18, 2018  9:30 AM CDT   ESTABLISHED PRENATAL with Shirley Pardo MD   [facility] ([facility])    [address]  [place] [address]   [phone]            May 25, 2018  9:30 AM CDT   ESTABLISHED PRENATAL with Shirley Pardo MD   [facility] ([facility])    [address]  [place] [address]   [phone]            Jun 01, 2018  9:30 AM CDT   ESTABLISHED PRENATAL with Shirley Pardo MD   [facility] ([facility])    [address]  [place] [address]   [phone]              Who to contact     If you have questions or need follow up information about today's clinic visit or your schedule please contact [facility] directly at [phone].  Normal or non-critical lab and imaging results will be communicated to you by MyChart, letter or phone within 4 business days after the clinic has received the  "results. If you do not hear from us within 7 days, please contact the clinic through [employer] or phone. If you have a critical or abnormal lab result, we will notify you by phone as soon as possible.  Submit refill requests through [employer] or call your pharmacy and they will forward the refill request to us. Please allow 3 business days for your refill to be completed.          Additional Information About Your Visit        [employer] Information     [employer] lets you send messages to your doctor, view your test results, renew your prescriptions, schedule appointments and more. To sign up, go to [email or web address] . Click on \"Log in\" on the left side of the screen, which will take you to the Welcome page. Then click on \"Sign up Now\" on the right side of the page.     You will be asked to enter the access code listed below, as well as some personal information. Please follow the directions to create your username and password.     Your access code is: [email or web address]  Expires: 2018  5:10 PM     Your access code will  in 90 days. If you need help or a new code, please call your [place] clinic or [phone].        Care EveryWhere ID     This is your Care EveryWhere ID. This could be used by other organizations to access your [place] medical records  [id number]        Your Vitals Were     Pulse BMI (Body Mass Index)                84 23.72 kg/m2           Blood Pressure from Last 3 Encounters:   18 104/66   18 98/60   18 102/60    Weight from Last 3 Encounters:   18 62.7 kg (138 lb 3 oz)   18 62.1 kg (136 lb 14.4 oz)   18 61 kg (134 lb 9 oz)              Today, you had the following     No orders found for display       Primary Care Provider Office Phone # Fax #    Dennise Marshall [phone]       [facility] [address] [facility] [address]        Equal Access to Services     AWA HERNANDEZ AH: Hadii augustus Cardenas " staci stackmaemilia gomezsneha devlin. So [facility] [phone].    ATENCIÓN: Si felicia urbina, tiene a herrera disposición servicios gratuitos de asistencia lingüística. Alexiame al [phone].    We comply with applicable federal civil rights laws and [place] laws. We do not discriminate on the basis of race, color, national origin, age, disability, sex, sexual orientation, or gender identity.            Thank you!     Thank you for choosing [facility]  for your care. Our goal is always to provide you with excellent care. Hearing back from our patients is one way we can continue to improve our services. Please take a few minutes to complete the written survey that you may receive in the mail after your visit with us. Thank you!             Your Updated Medication List - Protect others around you: Learn how to safely use, store and throw away your medicines at www.disposemymeds.org.          This list is accurate as of 4/20/18 10:00 AM.  Always use your most recent med list.                   Brand Name Dispense Instructions for use Diagnosis    albuterol 108 (90 Base) MCG/ACT Inhaler    PROAIR HFA/PROVENTIL HFA/VENTOLIN HFA     Inhale 2 puffs into the lungs 4 times daily as needed for wheezing        breast pump Misc     1 each    Reason for need: Breastfeeding. Length of need: 1 year    Encounter for supervision of normal first pregnancy in third trimester       CVS PRENATAL 28-0.8 MG Tabs      Take 1 tablet by mouth daily

## 2018-04-20 NOTE — PROGRESS NOTES
Return OB Visit    S: Patient has been feeling good. Occasional BH ctx, no VB or LOF. +FM    O: /66 (BP Location: Right arm, Patient Position: Sitting, Cuff Size: Adult Large)  Pulse 84  Wt 62.7 kg (138 lb 3 oz)  BMI 23.72 kg/m2  Gen: Well-appearing, NAD  See OB Flowsheet    A/P:  Romelia Bhandari is a 28 year old  at 34w0d by 7w0d US, here for return OB visit.  Plans breastfeeding- breast pump Rx 2018   Epidural  Mirena  S<D: minimal growth since last visit but still wnl. If progresses at next appt, will get growth US      PNC:   - Prenatal labs: A positive, RI, HIV NR, treponema NR, HepBsAg NR (scanned records). GCT 96  - Genetics: declines  - Imaging: dating US 7w0d (scanned records), normal anatomy  - Immunizations: flu 17. Tdap 3/7/2018   RTC 2 weeks- GBS next visit    Shirley Pardo MD  OB/GYN  2018 9:49 AM

## 2018-04-20 NOTE — NURSING NOTE
Patient presents today for her prenatal check-up. She is currently 34 weeks.  Barb Storm LPN  4/20/2018  9:43 AM

## 2018-04-24 ENCOUNTER — HOSPITAL ENCOUNTER (OUTPATIENT)
Facility: OTHER | Age: 28
Discharge: HOME | End: 2018-04-24
Attending: FAMILY MEDICINE | Admitting: FAMILY MEDICINE
Payer: COMMERCIAL

## 2018-04-24 ENCOUNTER — NURSE TRIAGE (OUTPATIENT)
Dept: OBGYN | Facility: OTHER | Age: 28
End: 2018-04-24

## 2018-04-24 VITALS
HEART RATE: 71 BPM | RESPIRATION RATE: 18 BRPM | WEIGHT: 139 LBS | SYSTOLIC BLOOD PRESSURE: 108 MMHG | DIASTOLIC BLOOD PRESSURE: 71 MMHG | HEIGHT: 64 IN | BODY MASS INDEX: 23.73 KG/M2 | OXYGEN SATURATION: 100 % | TEMPERATURE: 97.5 F

## 2018-04-24 PROBLEM — Z36.89 ENCOUNTER FOR TRIAGE IN PREGNANT PATIENT: Status: ACTIVE | Noted: 2018-04-24

## 2018-04-24 LAB
A1 MICROGLOB PLACENTAL VAG QL: NEGATIVE
ALBUMIN UR-MCNC: NEGATIVE MG/DL
AMORPH CRY #/AREA URNS HPF: ABNORMAL /HPF
APPEARANCE UR: ABNORMAL
BACTERIA #/AREA URNS HPF: ABNORMAL /HPF
BILIRUB UR QL STRIP: NEGATIVE
C TRACH DNA SPEC QL PROBE+SIG AMP: NOT DETECTED
COLOR UR AUTO: YELLOW
GLUCOSE UR STRIP-MCNC: NEGATIVE MG/DL
HGB UR QL STRIP: NEGATIVE
KETONES UR STRIP-MCNC: NEGATIVE MG/DL
LEUKOCYTE ESTERASE UR QL STRIP: ABNORMAL
N GONORRHOEA DNA SPEC QL PROBE+SIG AMP: NOT DETECTED
NITRATE UR QL: NEGATIVE
NON-SQ EPI CELLS #/AREA URNS LPF: ABNORMAL /LPF
PH UR STRIP: 7 PH (ref 5–7)
RBC #/AREA URNS AUTO: ABNORMAL /HPF
SOURCE: ABNORMAL
SP GR UR STRIP: 1.01 (ref 1–1.03)
SPECIMEN SOURCE: NORMAL
SPECIMEN SOURCE: NORMAL
UROBILINOGEN UR STRIP-ACNC: 0.2 EU/DL (ref 0.2–1)
WBC #/AREA URNS AUTO: ABNORMAL /HPF
WET PREP SPEC: NORMAL

## 2018-04-24 PROCEDURE — 87591 N.GONORRHOEAE DNA AMP PROB: CPT | Performed by: OBSTETRICS & GYNECOLOGY

## 2018-04-24 PROCEDURE — 87081 CULTURE SCREEN ONLY: CPT | Performed by: OBSTETRICS & GYNECOLOGY

## 2018-04-24 PROCEDURE — 25000132 ZZH RX MED GY IP 250 OP 250 PS 637: Performed by: OBSTETRICS & GYNECOLOGY

## 2018-04-24 PROCEDURE — 99214 OFFICE O/P EST MOD 30 MIN: CPT | Performed by: OBSTETRICS & GYNECOLOGY

## 2018-04-24 PROCEDURE — 87210 SMEAR WET MOUNT SALINE/INK: CPT | Performed by: OBSTETRICS & GYNECOLOGY

## 2018-04-24 PROCEDURE — 84112 EVAL AMNIOTIC FLUID PROTEIN: CPT | Performed by: FAMILY MEDICINE

## 2018-04-24 PROCEDURE — 96372 THER/PROPH/DIAG INJ SC/IM: CPT

## 2018-04-24 PROCEDURE — 96374 THER/PROPH/DIAG INJ IV PUSH: CPT

## 2018-04-24 PROCEDURE — 25000128 H RX IP 250 OP 636: Performed by: OBSTETRICS & GYNECOLOGY

## 2018-04-24 PROCEDURE — 87491 CHLMYD TRACH DNA AMP PROBE: CPT | Performed by: OBSTETRICS & GYNECOLOGY

## 2018-04-24 PROCEDURE — 81001 URINALYSIS AUTO W/SCOPE: CPT | Performed by: FAMILY MEDICINE

## 2018-04-24 RX ORDER — CEFAZOLIN SODIUM 2 G/100ML
2 INJECTION, SOLUTION INTRAVENOUS ONCE
Status: COMPLETED | OUTPATIENT
Start: 2018-04-24 | End: 2018-04-24

## 2018-04-24 RX ORDER — BETAMETHASONE SODIUM PHOSPHATE AND BETAMETHASONE ACETATE 3; 3 MG/ML; MG/ML
12 INJECTION, SUSPENSION INTRA-ARTICULAR; INTRALESIONAL; INTRAMUSCULAR; SOFT TISSUE ONCE
Status: COMPLETED | OUTPATIENT
Start: 2018-04-24 | End: 2018-04-24

## 2018-04-24 RX ORDER — NIFEDIPINE 10 MG/1
20 CAPSULE ORAL ONCE
Status: COMPLETED | OUTPATIENT
Start: 2018-04-24 | End: 2018-04-24

## 2018-04-24 RX ADMIN — CEFAZOLIN SODIUM 2 G: 2 INJECTION, SOLUTION INTRAVENOUS at 12:14

## 2018-04-24 RX ADMIN — BETAMETHASONE SODIUM PHOSPHATE AND BETAMETHASONE ACETATE 12 MG: 3; 3 INJECTION, SUSPENSION INTRA-ARTICULAR; INTRALESIONAL; INTRAMUSCULAR at 12:14

## 2018-04-24 RX ADMIN — NIFEDIPINE 20 MG: 10 CAPSULE ORAL at 12:13

## 2018-04-24 NOTE — ED TRIAGE NOTES
Pt reports having severe cramping starting last night. Pain in lower abd and back. Pt was having aida domínguez last night. Cramping has gotten. Pt denies change in fetal movement, denies bleeding and or discharge.    Jazzy Mckeon RN on 4/24/2018 at 9:23 AM

## 2018-04-24 NOTE — PROGRESS NOTES
"Obstetrics Triage Note    HPI:  Romelia Bhandari is a 28 year old  female at 34w4d by 7w0d US here with complaints of contractions. She reports they started last night after walking her dog but went away when she rested. She had damp underwear after the walk but no continued leaking. No VB. Around 0300, her contractions started again and they have become more frequent and intense.       She states that she has otherwise been feeling well. She denies fever, N/V, chest pain, SOB, abdominal pain, vaginal bleeding, vaginal discharge, dysuria, constipation.    ROS:  Negative except as mentioned in HPI.    PMH:  Past Medical History:   Diagnosis Date     H/O abnormal cervical Papanicolaou smear      H/O LEEP        PSHx:  Past Surgical History:   Procedure Laterality Date     EXTRACTION(S) DENTAL      age 16,no anesthetic problems       Medications:    No current facility-administered medications on file prior to encounter.   Current Outpatient Prescriptions on File Prior to Encounter:  albuterol (PROAIR HFA/PROVENTIL HFA/VENTOLIN HFA) 108 (90 BASE) MCG/ACT Inhaler Inhale 2 puffs into the lungs 4 times daily as needed for wheezing   Misc. Devices (BREAST PUMP) MISC Reason for need: Breastfeeding. Length of need: 1 year   Prenatal Vit-Fe Fumarate-FA (CVS PRENATAL) 28-0.8 MG TABS Take 1 tablet by mouth daily        Allergies:     Allergies   Allergen Reactions     Penicillins Rash       Physical Exam:   Vitals:    18 0921   BP: 108/71   Pulse: 71   Resp: 18   Temp: 97.5  F (36.4  C)   TempSrc: Temporal   SpO2: 100%   Weight: 63 kg (139 lb)   Height: 1.626 m (5' 4\")      Gen: resting comfortably, in NAD  CV: RRR, no m/r/g  Pulm: CTAB, no increased work of breathing  Abd: soft, gravid, non-tender, non-distended  Cx: 1/20/high on initial exam. 2/70/-2 on repeat exam     NST:  FHT: , mod bell, + accels, no decels  Alpha: 3-4 in 10 min    Labs/Imaging:  Results for orders placed or performed during the " hospital encounter of 18 (from the past 24 hour(s))   *UA reflex to Microscopic   Result Value Ref Range    Color Urine Yellow     Appearance Urine Cloudy     Glucose Urine Negative NEG^Negative mg/dL    Bilirubin Urine Negative NEG^Negative    Ketones Urine Negative NEG^Negative mg/dL    Specific Gravity Urine 1.010 1.003 - 1.035    Blood Urine Negative NEG^Negative    pH Urine 7.0 5.0 - 7.0 pH    Protein Albumin Urine Negative NEG^Negative mg/dL    Urobilinogen Urine 0.2 0.2 - 1.0 EU/dL    Nitrite Urine Negative NEG^Negative    Leukocyte Esterase Urine Trace (A) NEG^Negative    Source Midstream Urine    Urine Microscopic   Result Value Ref Range    WBC Urine 0 - 5 OTO5^0 - 5 /HPF    RBC Urine O - 2 OTO2^O - 2 /HPF    Squamous Epithelial /LPF Urine Moderate (A) FEW^Few /LPF    Bacteria Urine Few (A) NEG^Negative /HPF    Amorphous Crystals Many (A) NEG^Negative /HPF   Rupture of membranes by Amnisure   Result Value Ref Range    Amnisure Negative NEG^Negative   Wet prep   Result Value Ref Range    Specimen Description Vagina     Wet Prep No yeast seen     Wet Prep No Trichomonas seen     Wet Prep No clue cells seen        Assessment/Plan: Romelia Bhandari is a 28 year old  at 34w4d by 7w0d US, here for  contractions. Cervical change from 1 to 2 cm over the course of 1.5 hours with continued contractions. Discussed recommendation for transfer to [place] for higher level of care secondary to  labor. Bethamethasone given x1 for fetal lung maturity, Ancef for GBS prophylaxis, and nifedipine for tocolysis given prior to transfer. GBS and GC/Chlamydia collected and pending at time of discharge.  Patient discussed with Dr Ferrer, at [facility], who accepted patient in transfer.    Shirley Pardo MD  OBGYN  2018 10:27 AM

## 2018-04-24 NOTE — PROGRESS NOTES
NSG DISCHARGE NOTE    Patient discharged to [facility] at 1:09 PM via gurney. Accompanied by other: and meds one staff. Discharge instructions reviewed with patient, opportunity offered to ask questions. Prescriptions - None ordered for discharge. All belongings sent with patient.    Brenda Nicholas

## 2018-04-24 NOTE — PROGRESS NOTES
Nurse to Nurse report given to Karma ADAMSON at [facility].

## 2018-04-24 NOTE — TELEPHONE ENCOUNTER
"  Reason for Disposition    Increased pressure in pelvic area    Additional Information    Negative: Passed out (i.e., lost consciousness, collapsed and was not responding)    Negative: Shock suspected (e.g., cold/pale/clammy skin, too weak to stand, low BP, rapid pulse)    Negative: Difficult to awaken or acting confused  (e.g., disoriented, slurred speech)    Negative: [1] SEVERE abdominal pain (e.g., excruciating) AND [2] constant AND [3] present > 1 hour    Negative: Severe bleeding (e.g., continuous red blood from vagina, or large blood clots)    Negative: Umbilical cord hanging out of the vagina (shiny, white, curled appearance, \"like telephone cord\")    Negative: Uncontrollable urge to push (i.e., feels like baby is coming out now)    Negative: Can see baby    Negative: Sounds like a life-threatening emergency to the triager    Negative: MODERATE-SEVERE abdominal pain    Negative: Contractions < 10 minutes apart for 1 hour (i.e., 6 or more contractions an hour)    Negative: [1] Contractions > 10 minutes apart AND [2] persist > 24 hours AND [3] no improvement using Care Advice    Negative: [1] Contractions AND [2] any vaginal bleeding (including: red blood, clots, spotting, or pink/brown mucous)    Negative: Vaginal bleeding    (Exception: spotting after intercourse or pelvic exam, or slight pinkish or brownish mucous discharge)    Negative: Leakage of fluid from vagina    Negative: [1] Baby moving less today (e.g., kick count < 5 in 1 hour or < 10 in 2 hours) AND [2] pregnant 23 or more weeks    Negative: No movement of baby for 8 hours    Negative: New hand or face swelling    Negative: Fever > 100.4 F (38.0 C)    Answer Assessment - Initial Assessment Questions  1. ONSET: \"When did the symptoms begin?\"         Last night aida domínguez contractions while walking, lasted throughout evening. Cramping into the night. Pain in lower back.  2. CONTRACTIONS: \"Describe the contractions that you are having.\" (e.g., " "duration, frequency, regularity, severity)      No contractions noted now or aida domínguez now.  3. KAUSHIK: \"What date are you expecting to deliver?\"      18  4. PARITY: \"Have you had a baby before?\" If yes, \"How long did the labor last?\"      no  5. FETAL MOVEMENT: \"Has the baby's movement decreased or changed significantly from normal?\"      Normal for baby  6. OTHER SYMPTOMS: \"Do you have any other symptoms?\" (e.g., leaking fluid from vagina, fever, hand/facial swelling)      No fluid leakage or bleeding noted    Protocols used: PREGNANCY - LABOR - -ADULT-AH    Patient called and states that since last night she has been having increased frequency of aida domínguez contractions, pelvic pressure and back pain. Patient states baby is moving, but unsure if she could verify 5 movement is an hour. Denies fluid leakage at this time. Patient states she has more swelling in feet today then she has had in the past. Patient will have  drive her in to be monitored in labor and delivery per protocol. Madison Allen RN on 2018 at 8:58 AM    "

## 2018-04-24 NOTE — PROGRESS NOTES
Pt here from ER triage with complaints of contractions and pelvic pain. Pt is 34 5/7 weeks with an EDC of 6/1/18. EFM placed. Urine collected.

## 2018-04-26 LAB
BACTERIA SPEC CULT: NORMAL
SPECIMEN SOURCE: NORMAL

## 2018-04-27 ENCOUNTER — PRENATAL OFFICE VISIT (OUTPATIENT)
Dept: OBGYN | Facility: OTHER | Age: 28
End: 2018-04-27
Attending: OBSTETRICS & GYNECOLOGY
Payer: COMMERCIAL

## 2018-04-27 VITALS
DIASTOLIC BLOOD PRESSURE: 64 MMHG | SYSTOLIC BLOOD PRESSURE: 112 MMHG | HEART RATE: 80 BPM | WEIGHT: 138.4 LBS | BODY MASS INDEX: 23.76 KG/M2

## 2018-04-27 DIAGNOSIS — O26.843 UTERINE SIZE-DATE DISCREPANCY IN THIRD TRIMESTER: ICD-10-CM

## 2018-04-27 DIAGNOSIS — O09.93 SUPERVISION OF HIGH RISK PREGNANCY IN THIRD TRIMESTER: Primary | ICD-10-CM

## 2018-04-27 PROCEDURE — 99207 ZZC OB VISIT-NO CHARGE - GICH ONLY: CPT | Performed by: OBSTETRICS & GYNECOLOGY

## 2018-04-27 PROCEDURE — 59025 FETAL NON-STRESS TEST: CPT | Performed by: OBSTETRICS & GYNECOLOGY

## 2018-04-27 NOTE — PROGRESS NOTES
Return OB Visit    S: Patient reports she has been doing well since discharge from [place] yesterday. Occasional BH ctx but nothing uncomfortable or painful. No VB or LOF. +FM.     O: /64 (BP Location: Right arm, Patient Position: Chair, Cuff Size: Adult Regular)  Pulse 80  Wt 62.8 kg (138 lb 6.4 oz)  BMI 23.76 kg/m2  Gen: Well-appearing, NAD  See OB Flowsheet    NST: 130, mod variability, + accels, no decels. [place]: no ctx    A/P:  Romelia Bhandari is a 28 year old  at 35w0d by 7w0d US, here for return OB visit.   labor: now stable. S/p BMZ on  and   Plans breastfeeding- breast pump Rx 2018   Epidural  Mirena  S<D: had growth US with MFM, EFW 2095g (11%ile), normal BPP and UA dopplers. Per recs, start weekly  testing      PNC:   - Prenatal labs: A positive, RI, HIV NR, treponema NR, HepBsAg NR (scanned records). GCT 96. GBS negative  - Genetics: declines  - Imaging: dating US 7w0d (scanned records), normal anatomy  - Immunizations: flu 17. Tdap 3/7/2018   RTC weekly until delivery with NSTs    Shirley Pardo MD  OB/GYN  2018 9:06 AM

## 2018-04-27 NOTE — MR AVS SNAPSHOT
After Visit Summary   4/27/2018    Romelia Bhandari    MRN: [id number]           Patient Information     Date Of Birth          1990        Visit Information        Provider Department      4/27/2018 9:00 AM Shirley Pardo MD [facility]        Today's Diagnoses     Supervision of high risk pregnancy in third trimester    -  1    Uterine size-date discrepancy in third trimester           Follow-ups after your visit        Your next 10 appointments already scheduled     May 04, 2018  9:30 AM CDT   ESTABLISHED PRENATAL with Shirley Pardo MD   [facility]    [address]  [facility] [address]   [phone]            May 11, 2018  9:30 AM CDT   ESTABLISHED PRENATAL with Shirley Pardo MD   [facility]    [address]  [place] [address]   [phone]            May 18, 2018  9:30 AM CDT   ESTABLISHED PRENATAL with Shirley Pardo MD   [facility]    [address]  [place] [address]   [phone]            May 25, 2018  9:30 AM CDT   ESTABLISHED PRENATAL with Shirley Pardo MD   [facility]    [address]  [place] [address]   [phone]            Jun 01, 2018  9:30 AM CDT   ESTABLISHED PRENATAL with Shirley Pardo MD   [facility]    [address]  [place] [address]   [phone]              Who to contact     If you have questions or need follow up information about today's clinic visit or your schedule please contact [facility] directly at [phone].  Normal or non-critical lab and imaging results will be communicated to you by MyChart, letter or phone within 4 business  "days after the clinic has received the results. If you do not hear from us within 7 days, please contact the clinic through [email or web address] or phone. If you have a critical or abnormal lab result, we will notify you by phone as soon as possible.  Submit refill requests through New.net or call your pharmacy and they will forward the refill request to us. Please allow 3 business days for your refill to be completed.          Additional Information About Your Visit        New.net Information     New.net lets you send messages to your doctor, view your test results, renew your prescriptions, schedule appointments and more. To sign up, go to [email or web address] . Click on \"Log in\" on the left side of the screen, which will take you to the Welcome page. Then click on \"Sign up Now\" on the right side of the page.     You will be asked to enter the access code listed below, as well as some personal information. Please follow the directions to create your username and password.     Your access code is: [email or web address]  Expires: 2018  5:10 PM     Your access code will  in 90 days. If you need help or a new code, please call your [place] clinic or [phone].        Care EveryWhere ID     This is your Care EveryWhere ID. This could be used by other organizations to access your [place] medical records  [id number]        Your Vitals Were     Pulse BMI (Body Mass Index)                80 23.76 kg/m2           Blood Pressure from Last 3 Encounters:   18 112/64   18 108/71   18 104/66    Weight from Last 3 Encounters:   18 62.8 kg (138 lb 6.4 oz)   18 63 kg (139 lb)   18 62.7 kg (138 lb 3 oz)              We Performed the Following     FETAL NON-STRESS TEST        Primary Care Provider Office Phone # Fax #    Dennise Marshall [phone]       [facility] [address] [facility] [address]        Equal Access to Services     AWA HERNANDEZ AH: Hadii aad ku " rene Pelaez, augustus vidalheidiha, staci kaabad sheridan, sneha dorysin hayaan jasonnanda rosielinda laharrisjarvis britton. So [facility] [phone].    ATENCIÓN: Si habla español, tiene a herrera disposición servicios gratuitos de asistencia lingüística. Antonio al [phone].    We comply with applicable federal civil rights laws and [place] laws. We do not discriminate on the basis of race, color, national origin, age, disability, sex, sexual orientation, or gender identity.            Thank you!     Thank you for choosing [facility] AND [facility]  for your care. Our goal is always to provide you with excellent care. Hearing back from our patients is one way we can continue to improve our services. Please take a few minutes to complete the written survey that you may receive in the mail after your visit with us. Thank you!             Your Updated Medication List - Protect others around you: Learn how to safely use, store and throw away your medicines at www.disposemymeds.org.          This list is accurate as of 4/27/18 10:51 AM.  Always use your most recent med list.                   Brand Name Dispense Instructions for use Diagnosis    albuterol 108 (90 Base) MCG/ACT Inhaler    PROAIR HFA/PROVENTIL HFA/VENTOLIN HFA     Inhale 2 puffs into the lungs 4 times daily as needed for wheezing        breast pump Misc     1 each    Reason for need: Breastfeeding. Length of need: 1 year    Encounter for supervision of normal first pregnancy in third trimester       CVS PRENATAL 28-0.8 MG Tabs      Take 1 tablet by mouth daily

## 2018-05-04 ENCOUNTER — PRENATAL OFFICE VISIT (OUTPATIENT)
Dept: OBGYN | Facility: OTHER | Age: 28
End: 2018-05-04
Attending: OBSTETRICS & GYNECOLOGY
Payer: COMMERCIAL

## 2018-05-04 VITALS
HEART RATE: 88 BPM | BODY MASS INDEX: 24.1 KG/M2 | DIASTOLIC BLOOD PRESSURE: 60 MMHG | WEIGHT: 140.38 LBS | SYSTOLIC BLOOD PRESSURE: 100 MMHG

## 2018-05-04 DIAGNOSIS — Z34.03 ENCOUNTER FOR SUPERVISION OF NORMAL FIRST PREGNANCY IN THIRD TRIMESTER: Primary | ICD-10-CM

## 2018-05-04 DIAGNOSIS — O26.843 UTERINE SIZE-DATE DISCREPANCY IN THIRD TRIMESTER: ICD-10-CM

## 2018-05-04 PROCEDURE — 59025 FETAL NON-STRESS TEST: CPT | Performed by: OBSTETRICS & GYNECOLOGY

## 2018-05-04 PROCEDURE — 99207 ZZC OB VISIT-NO CHARGE - GICH ONLY: CPT | Mod: 25 | Performed by: OBSTETRICS & GYNECOLOGY

## 2018-05-04 ASSESSMENT — PAIN SCALES - GENERAL: PAINLEVEL: NO PAIN (0)

## 2018-05-04 NOTE — MR AVS SNAPSHOT
After Visit Summary   5/4/2018    Romelia Bhandari    MRN: [id number]           Patient Information     Date Of Birth          1990        Visit Information        Provider Department      5/4/2018 9:30 AM Shirley Pardo MD [facility]        Today's Diagnoses     Encounter for supervision of normal first pregnancy in third trimester    -  1    Uterine size-date discrepancy in third trimester           Follow-ups after your visit        Your next 10 appointments already scheduled     May 11, 2018  9:30 AM CDT   ESTABLISHED PRENATAL with Shirley Pardo MD   [facility] ([facility])    [address]  [place] [address]   [phone]            May 18, 2018  9:30 AM CDT   ESTABLISHED PRENATAL with Shirley Pardo MD   [facility] ([facility])    [address]  [place] [address]   [phone]            May 25, 2018  9:30 AM CDT   ESTABLISHED PRENATAL with Shirlye Pardo MD   [facility] ([facility])    [address]  [place] [address]   [phone]            Jun 01, 2018  9:30 AM CDT   ESTABLISHED PRENATAL with Shirley Pardo MD   [facility] ([facility])    [address]  [place] [address]   [phone]              Who to contact     If you have questions or need follow up information about today's clinic visit or your schedule please contact [facility] directly at [phone].  Normal or non-critical lab and imaging results will be communicated to you by MyChart, letter or phone within 4 business days after the clinic has received the results. If you do not hear from us within 7 days, please contact the clinic through MyChart or phone. If you have a critical or abnormal lab result, we will notify you by  "phone as soon as possible.  Submit refill requests through [employer] or call your pharmacy and they will forward the refill request to us. Please allow 3 business days for your refill to be completed.          Additional Information About Your Visit        [employer] Information     LIQVID lets you send messages to your doctor, view your test results, renew your prescriptions, schedule appointments and more. To sign up, go to [email or web address] . Click on \"Log in\" on the left side of the screen, which will take you to the Welcome page. Then click on \"Sign up Now\" on the right side of the page.     You will be asked to enter the access code listed below, as well as some personal information. Please follow the directions to create your username and password.     Your access code is: [email or web address]  Expires: 2018  5:10 PM     Your access code will  in 90 days. If you need help or a new code, please call your [place] clinic or [phone].        Care EveryWhere ID     This is your Care EveryWhere ID. This could be used by other organizations to access your [place] medical records  [id number]        Your Vitals Were     Pulse BMI (Body Mass Index)                88 24.1 kg/m2           Blood Pressure from Last 3 Encounters:   18 100/60   18 112/64   18 108/71    Weight from Last 3 Encounters:   18 63.7 kg (140 lb 6 oz)   18 62.8 kg (138 lb 6.4 oz)   18 63 kg (139 lb)              We Performed the Following     FETAL NON-STRESS TEST        Primary Care Provider Office Phone # Fax #    Dennise Marshall [phone]       [facility] [address] [facility] [address]        Equal Access to Services     AWA HERNANDEZ : Ramirez Pelaez, augustus stack, sneha becerril. So [facility] [phone].    ATENCIÓN: Si habla español, tiene a herrera disposición servicios gratuitos de asistencia " lingüística. Antonio al [phone].    We comply with applicable federal civil rights laws and [place] laws. We do not discriminate on the basis of race, color, national origin, age, disability, sex, sexual orientation, or gender identity.            Thank you!     Thank you for choosing [facility] AND [facility]  for your care. Our goal is always to provide you with excellent care. Hearing back from our patients is one way we can continue to improve our services. Please take a few minutes to complete the written survey that you may receive in the mail after your visit with us. Thank you!             Your Updated Medication List - Protect others around you: Learn how to safely use, store and throw away your medicines at www.disposemymeds.org.          This list is accurate as of 5/4/18 10:17 AM.  Always use your most recent med list.                   Brand Name Dispense Instructions for use Diagnosis    albuterol 108 (90 Base) MCG/ACT Inhaler    PROAIR HFA/PROVENTIL HFA/VENTOLIN HFA     Inhale 2 puffs into the lungs 4 times daily as needed for wheezing        breast pump Misc     1 each    Reason for need: Breastfeeding. Length of need: 1 year    Encounter for supervision of normal first pregnancy in third trimester       CVS PRENATAL 28-0.8 MG Tabs      Take 1 tablet by mouth daily

## 2018-05-04 NOTE — NURSING NOTE
Patient presents today for her prenatal visit. She is currently 36w0d.  Barb Storm LPN  5/4/2018  9:35 AM

## 2018-05-04 NOTE — PROGRESS NOTES
Return OB Visit    S: Patient reports she has been feeling well. More BH ctx this week but not painful. No VB or LOF. +FM.     O: /60 (BP Location: Right arm, Patient Position: Sitting, Cuff Size: Adult Large)  Pulse 88  Wt 63.7 kg (140 lb 6 oz)  BMI 24.1 kg/m2  Gen: Well-appearing, NAD  See OB Flowsheet    NST: 135, mod variability, + accels, no decels  [place]: quiet    A/P:  Romelia Bhandari is a 28 year old  at 36w0d by 7w0d US, here for return OB visit.   labor: now stable. S/p BMZ on  and   Plans breastfeeding- breast pump Rx 2018   Epidural  Mirena  Dr Patel for   S<D: had growth US with MFM, EFW 2095g (11%ile), normal BPP and UA dopplers. Per recs, start weekly  testing      PNC:   - Prenatal labs: A positive, RI, HIV NR, treponema NR, HepBsAg NR (scanned records). GCT 96. GBS negative  - Genetics: declines  - Imaging: dating US 7w0d (scanned records), normal anatomy  - Immunizations: flu 17. Tdap 3/7/2018   RTC weekly until delivery with NSTs    Shirley Pardo MD  OB/GYN  2018 9:53 AM

## 2018-05-11 ENCOUNTER — PRENATAL OFFICE VISIT (OUTPATIENT)
Dept: OBGYN | Facility: OTHER | Age: 28
End: 2018-05-11
Attending: OBSTETRICS & GYNECOLOGY
Payer: COMMERCIAL

## 2018-05-11 VITALS
HEART RATE: 76 BPM | DIASTOLIC BLOOD PRESSURE: 66 MMHG | BODY MASS INDEX: 24.2 KG/M2 | SYSTOLIC BLOOD PRESSURE: 106 MMHG | WEIGHT: 141 LBS

## 2018-05-11 DIAGNOSIS — Z34.03 ENCOUNTER FOR SUPERVISION OF NORMAL FIRST PREGNANCY IN THIRD TRIMESTER: Primary | ICD-10-CM

## 2018-05-11 DIAGNOSIS — O26.843 UTERINE SIZE-DATE DISCREPANCY IN THIRD TRIMESTER: ICD-10-CM

## 2018-05-11 PROCEDURE — 59025 FETAL NON-STRESS TEST: CPT | Performed by: OBSTETRICS & GYNECOLOGY

## 2018-05-11 PROCEDURE — 99207 ZZC OB VISIT-NO CHARGE - GICH ONLY: CPT | Mod: 25 | Performed by: OBSTETRICS & GYNECOLOGY

## 2018-05-11 ASSESSMENT — PAIN SCALES - GENERAL: PAINLEVEL: NO PAIN (0)

## 2018-05-11 NOTE — PROGRESS NOTES
Return OB Visit    S: Patient has been feeling pretty good. More ctx this week. No VB or LOF but did lose her mucous plug. +FM.    O: /66 (BP Location: Right arm, Patient Position: Sitting, Cuff Size: Adult Regular)  Pulse 76  Wt 64 kg (141 lb)  BMI 24.2 kg/m2  Gen: Well-appearing, NAD  See OB Flowsheet    NST: 135, mod variability, + accels, no decels  [place]: quiet    A/P:  Romelia Bhandari is a 28 year old  at 37w0d by 7w0d US, here for return OB visit.   labor: now stable. S/p BMZ on  and   Plans breastfeeding- breast pump Rx 2018   Epidural  Mirena  Dr Patel for   S<D: had growth US with MFM, EFW 2095g (11%ile), normal BPP and UA dopplers. Per recs, start weekly  testing      PNC:   - Prenatal labs: A positive, RI, HIV NR, treponema NR, HepBsAg NR (scanned records). GCT 96. GBS negative (expires )  - Genetics: declines  - Imaging: dating US 7w0d (scanned records), normal anatomy  - Immunizations: flu 17. Tdap 3/7/2018   RTC weekly until delivery with NSTs    Shirley Pardo MD  OB/GYN  2018 9:39 AM

## 2018-05-11 NOTE — NURSING NOTE
Patient presents today for her prenatal check-up. She is currently 37w0d.  Barb Storm LPN  5/11/2018  9:34 AM

## 2018-05-11 NOTE — MR AVS SNAPSHOT
After Visit Summary   5/11/2018    Romelia Bhandari    MRN: [id number]           Patient Information     Date Of Birth          1990        Visit Information        Provider Department      5/11/2018 9:30 AM Shirley Pardo MD [facility]        Today's Diagnoses     Encounter for supervision of normal first pregnancy in third trimester    -  1       Follow-ups after your visit        Your next 10 appointments already scheduled     May 18, 2018  9:30 AM CDT   ESTABLISHED PRENATAL with Shirley Pardo MD   [facility]    [address]  [place] [address]   [phone]            May 25, 2018  9:30 AM CDT   ESTABLISHED PRENATAL with Shirley Pardo MD   [facility]    [address]  [place] [address]   [phone]            Jun 01, 2018  9:30 AM CDT   ESTABLISHED PRENATAL with Shirley Pardo MD   [facility]    [address]  [place] [address]   [phone]              Who to contact     If you have questions or need follow up information about today's clinic visit or your schedule please contact [facility] directly at [phone].  Normal or non-critical lab and imaging results will be communicated to you by MyChart, letter or phone within 4 business days after the clinic has received the results. If you do not hear from us within 7 days, please contact the clinic through MyChart or phone. If you have a critical or abnormal lab result, we will notify you by phone as soon as possible.  Submit refill requests through [employer] or call your pharmacy and they will forward the refill request to us. Please allow 3 business days for your refill to be completed.          Additional Information About Your Visit        MyChart Information   "   [employer] lets you send messages to your doctor, view your test results, renew your prescriptions, schedule appointments and more. To sign up, go to [email or web address] . Click on \"Log in\" on the left side of the screen, which will take you to the Welcome page. Then click on \"Sign up Now\" on the right side of the page.     You will be asked to enter the access code listed below, as well as some personal information. Please follow the directions to create your username and password.     Your access code is: [email or web address]  Expires: 2018  5:10 PM     Your access code will  in 90 days. If you need help or a new code, please call your [place] clinic or [phone].        Care EveryWhere ID     This is your Care EveryWhere ID. This could be used by other organizations to access your [place] medical records  [id number]        Your Vitals Were     Pulse BMI (Body Mass Index)                76 24.2 kg/m2           Blood Pressure from Last 3 Encounters:   18 106/66   18 100/60   18 112/64    Weight from Last 3 Encounters:   18 64 kg (141 lb)   18 63.7 kg (140 lb 6 oz)   18 62.8 kg (138 lb 6.4 oz)              Today, you had the following     No orders found for display       Primary Care Provider Office Phone # Fax #    Dennise Marshall [phone]       [facility] [address] [place] [address]        Equal Access to Services     [facility]: Hadii jeremías casey hadasho Soomaali, waaxda luqadaha, qaybta kaalmasneha bishop . So [facility] [phone].    ATENCIÓN: Si habla español, tiene a herrera disposición servicios gratuitos de asistencia lingüística. Antonio al [phone].    We comply with applicable federal civil rights laws and [place] laws. We do not discriminate on the basis of race, color, national origin, age, disability, sex, sexual orientation, or gender identity.            Thank you!     " Thank you for choosing [facility] AND [facility]  for your care. Our goal is always to provide you with excellent care. Hearing back from our patients is one way we can continue to improve our services. Please take a few minutes to complete the written survey that you may receive in the mail after your visit with us. Thank you!             Your Updated Medication List - Protect others around you: Learn how to safely use, store and throw away your medicines at www.disposemymeds.org.          This list is accurate as of 5/11/18  9:54 AM.  Always use your most recent med list.                   Brand Name Dispense Instructions for use Diagnosis    albuterol 108 (90 Base) MCG/ACT Inhaler    PROAIR HFA/PROVENTIL HFA/VENTOLIN HFA     Inhale 2 puffs into the lungs 4 times daily as needed for wheezing        breast pump Misc     1 each    Reason for need: Breastfeeding. Length of need: 1 year    Encounter for supervision of normal first pregnancy in third trimester       CVS PRENATAL 28-0.8 MG Tabs      Take 1 tablet by mouth daily

## 2018-05-18 ENCOUNTER — ANESTHESIA EVENT (OUTPATIENT)
Dept: OBGYN | Facility: OTHER | Age: 28
End: 2018-05-18
Payer: COMMERCIAL

## 2018-05-18 ENCOUNTER — HOSPITAL ENCOUNTER (INPATIENT)
Facility: OTHER | Age: 28
LOS: 2 days | Discharge: HOME | End: 2018-05-20
Attending: OBSTETRICS & GYNECOLOGY | Admitting: OBSTETRICS & GYNECOLOGY
Payer: COMMERCIAL

## 2018-05-18 ENCOUNTER — PRENATAL OFFICE VISIT (OUTPATIENT)
Dept: OBGYN | Facility: OTHER | Age: 28
End: 2018-05-18
Attending: OBSTETRICS & GYNECOLOGY
Payer: COMMERCIAL

## 2018-05-18 ENCOUNTER — ANESTHESIA (OUTPATIENT)
Dept: OBGYN | Facility: OTHER | Age: 28
End: 2018-05-18
Payer: COMMERCIAL

## 2018-05-18 VITALS
HEART RATE: 74 BPM | DIASTOLIC BLOOD PRESSURE: 66 MMHG | SYSTOLIC BLOOD PRESSURE: 102 MMHG | BODY MASS INDEX: 24.34 KG/M2 | WEIGHT: 141.8 LBS

## 2018-05-18 DIAGNOSIS — Z34.03 ENCOUNTER FOR SUPERVISION OF NORMAL FIRST PREGNANCY IN THIRD TRIMESTER: Primary | ICD-10-CM

## 2018-05-18 DIAGNOSIS — O26.843 UTERINE SIZE-DATE DISCREPANCY IN THIRD TRIMESTER: ICD-10-CM

## 2018-05-18 PROBLEM — O36.8990 FETAL DISTRESS AFFECTING CARE: Status: ACTIVE | Noted: 2018-05-18

## 2018-05-18 LAB
ABO + RH BLD: NORMAL
ABO + RH BLD: NORMAL
BLD GP AB SCN SERPL QL: NORMAL
BLOOD BANK CMNT PATIENT-IMP: NORMAL
ERYTHROCYTE [DISTWIDTH] IN BLOOD BY AUTOMATED COUNT: 12.6 % (ref 10–15)
HCT VFR BLD AUTO: 38 % (ref 35–47)
HGB BLD-MCNC: 12.5 G/DL (ref 11.7–15.7)
MCH RBC QN AUTO: 29.6 PG (ref 26.5–33)
MCHC RBC AUTO-ENTMCNC: 32.9 G/DL (ref 31.5–36.5)
MCV RBC AUTO: 90 FL (ref 78–100)
PLATELET # BLD AUTO: 206 10E9/L (ref 150–450)
RBC # BLD AUTO: 4.23 10E12/L (ref 3.8–5.2)
SPECIMEN EXP DATE BLD: NORMAL
WBC # BLD AUTO: 9.2 10E9/L (ref 4–11)

## 2018-05-18 PROCEDURE — 25000128 H RX IP 250 OP 636: Performed by: OBSTETRICS & GYNECOLOGY

## 2018-05-18 PROCEDURE — 59025 FETAL NON-STRESS TEST: CPT | Performed by: OBSTETRICS & GYNECOLOGY

## 2018-05-18 PROCEDURE — 88307 TISSUE EXAM BY PATHOLOGIST: CPT | Performed by: OBSTETRICS & GYNECOLOGY

## 2018-05-18 PROCEDURE — 36415 COLL VENOUS BLD VENIPUNCTURE: CPT | Performed by: OBSTETRICS & GYNECOLOGY

## 2018-05-18 PROCEDURE — 25000128 H RX IP 250 OP 636: Performed by: NURSE ANESTHETIST, CERTIFIED REGISTERED

## 2018-05-18 PROCEDURE — 37000011 ZZH ANESTHESIA WARD SERVICE

## 2018-05-18 PROCEDURE — 25000125 ZZHC RX 250: Performed by: NURSE ANESTHETIST, CERTIFIED REGISTERED

## 2018-05-18 PROCEDURE — G0463 HOSPITAL OUTPT CLINIC VISIT: HCPCS

## 2018-05-18 PROCEDURE — 86850 RBC ANTIBODY SCREEN: CPT | Performed by: OBSTETRICS & GYNECOLOGY

## 2018-05-18 PROCEDURE — 59400 OBSTETRICAL CARE: CPT | Performed by: OBSTETRICS & GYNECOLOGY

## 2018-05-18 PROCEDURE — 0HQ9XZZ REPAIR PERINEUM SKIN, EXTERNAL APPROACH: ICD-10-PCS | Performed by: OBSTETRICS & GYNECOLOGY

## 2018-05-18 PROCEDURE — 59400 OBSTETRICAL CARE: CPT | Performed by: NURSE ANESTHETIST, CERTIFIED REGISTERED

## 2018-05-18 PROCEDURE — 12000027 ZZH R&B OB

## 2018-05-18 PROCEDURE — 86901 BLOOD TYPING SEROLOGIC RH(D): CPT | Performed by: OBSTETRICS & GYNECOLOGY

## 2018-05-18 PROCEDURE — 85027 COMPLETE CBC AUTOMATED: CPT | Performed by: OBSTETRICS & GYNECOLOGY

## 2018-05-18 PROCEDURE — 86900 BLOOD TYPING SEROLOGIC ABO: CPT | Performed by: OBSTETRICS & GYNECOLOGY

## 2018-05-18 PROCEDURE — 99207 ZZC OB VISIT-NO CHARGE - GICH ONLY: CPT | Performed by: OBSTETRICS & GYNECOLOGY

## 2018-05-18 PROCEDURE — 86592 SYPHILIS TEST NON-TREP QUAL: CPT | Mod: TC | Performed by: OBSTETRICS & GYNECOLOGY

## 2018-05-18 PROCEDURE — 86780 TREPONEMA PALLIDUM: CPT | Performed by: OBSTETRICS & GYNECOLOGY

## 2018-05-18 PROCEDURE — 25000132 ZZH RX MED GY IP 250 OP 250 PS 637: Performed by: OBSTETRICS & GYNECOLOGY

## 2018-05-18 RX ORDER — ROPIVACAINE HYDROCHLORIDE 2 MG/ML
10 INJECTION, SOLUTION EPIDURAL; INFILTRATION; PERINEURAL ONCE
Status: COMPLETED | OUTPATIENT
Start: 2018-05-18 | End: 2018-05-18

## 2018-05-18 RX ORDER — LIDOCAINE 40 MG/G
CREAM TOPICAL
Status: DISCONTINUED | OUTPATIENT
Start: 2018-05-18 | End: 2018-05-20 | Stop reason: CLARIF

## 2018-05-18 RX ORDER — IBUPROFEN 400 MG/1
800 TABLET, FILM COATED ORAL
Status: COMPLETED | OUTPATIENT
Start: 2018-05-18 | End: 2018-05-18

## 2018-05-18 RX ORDER — OXYCODONE AND ACETAMINOPHEN 5; 325 MG/1; MG/1
1 TABLET ORAL
Status: DISCONTINUED | OUTPATIENT
Start: 2018-05-18 | End: 2018-05-20 | Stop reason: HOSPADM

## 2018-05-18 RX ORDER — AMOXICILLIN 250 MG
1 CAPSULE ORAL 2 TIMES DAILY
Status: CANCELLED | OUTPATIENT
Start: 2018-05-18

## 2018-05-18 RX ORDER — SODIUM CHLORIDE, SODIUM LACTATE, POTASSIUM CHLORIDE, CALCIUM CHLORIDE 600; 310; 30; 20 MG/100ML; MG/100ML; MG/100ML; MG/100ML
INJECTION, SOLUTION INTRAVENOUS CONTINUOUS
Status: DISCONTINUED | OUTPATIENT
Start: 2018-05-18 | End: 2018-05-20 | Stop reason: CLARIF

## 2018-05-18 RX ORDER — MISOPROSTOL 100 UG/1
400 TABLET ORAL
Status: CANCELLED | OUTPATIENT
Start: 2018-05-18

## 2018-05-18 RX ORDER — TERBUTALINE SULFATE 1 MG/ML
0.25 INJECTION, SOLUTION SUBCUTANEOUS
Status: DISCONTINUED | OUTPATIENT
Start: 2018-05-18 | End: 2018-05-20 | Stop reason: CLARIF

## 2018-05-18 RX ORDER — HYDROCORTISONE 2.5 %
CREAM (GRAM) TOPICAL 3 TIMES DAILY PRN
Status: CANCELLED | OUTPATIENT
Start: 2018-05-18

## 2018-05-18 RX ORDER — NALOXONE HYDROCHLORIDE 0.4 MG/ML
.1-.4 INJECTION, SOLUTION INTRAMUSCULAR; INTRAVENOUS; SUBCUTANEOUS
Status: DISCONTINUED | OUTPATIENT
Start: 2018-05-18 | End: 2018-05-20 | Stop reason: HOSPADM

## 2018-05-18 RX ORDER — BISACODYL 10 MG
10 SUPPOSITORY, RECTAL RECTAL DAILY PRN
Status: CANCELLED | OUTPATIENT
Start: 2018-05-20

## 2018-05-18 RX ORDER — ONDANSETRON 2 MG/ML
4 INJECTION INTRAMUSCULAR; INTRAVENOUS EVERY 6 HOURS PRN
Status: DISCONTINUED | OUTPATIENT
Start: 2018-05-18 | End: 2018-05-20 | Stop reason: HOSPADM

## 2018-05-18 RX ORDER — LIDOCAINE HYDROCHLORIDE 10 MG/ML
INJECTION, SOLUTION INFILTRATION; PERINEURAL PRN
Status: DISCONTINUED | OUTPATIENT
Start: 2018-05-18 | End: 2018-05-20

## 2018-05-18 RX ORDER — PHENYLEPHRINE HCL IN 0.9% NACL 1 MG/10 ML
100 SYRINGE (ML) INTRAVENOUS EVERY 5 MIN PRN
Status: DISCONTINUED | OUTPATIENT
Start: 2018-05-18 | End: 2018-05-20 | Stop reason: CLARIF

## 2018-05-18 RX ORDER — FENTANYL CITRATE 50 UG/ML
100 INJECTION, SOLUTION INTRAMUSCULAR; INTRAVENOUS ONCE
Status: COMPLETED | OUTPATIENT
Start: 2018-05-18 | End: 2018-05-18

## 2018-05-18 RX ORDER — CARBOPROST TROMETHAMINE 250 UG/ML
250 INJECTION, SOLUTION INTRAMUSCULAR
Status: DISCONTINUED | OUTPATIENT
Start: 2018-05-18 | End: 2018-05-20 | Stop reason: HOSPADM

## 2018-05-18 RX ORDER — METHYLERGONOVINE MALEATE 0.2 MG/ML
200 INJECTION INTRAVENOUS
Status: DISCONTINUED | OUTPATIENT
Start: 2018-05-18 | End: 2018-05-20 | Stop reason: HOSPADM

## 2018-05-18 RX ORDER — OXYTOCIN 10 [USP'U]/ML
10 INJECTION, SOLUTION INTRAMUSCULAR; INTRAVENOUS
Status: DISCONTINUED | OUTPATIENT
Start: 2018-05-18 | End: 2018-05-20 | Stop reason: HOSPADM

## 2018-05-18 RX ORDER — NALBUPHINE HYDROCHLORIDE 20 MG/ML
2.5-5 INJECTION, SOLUTION INTRAMUSCULAR; INTRAVENOUS; SUBCUTANEOUS EVERY 6 HOURS PRN
Status: DISCONTINUED | OUTPATIENT
Start: 2018-05-18 | End: 2018-05-20 | Stop reason: HOSPADM

## 2018-05-18 RX ORDER — ACETAMINOPHEN 325 MG/1
650 TABLET ORAL EVERY 4 HOURS PRN
Status: DISCONTINUED | OUTPATIENT
Start: 2018-05-18 | End: 2018-05-20 | Stop reason: HOSPADM

## 2018-05-18 RX ORDER — ACETAMINOPHEN 325 MG/1
650 TABLET ORAL EVERY 4 HOURS PRN
Status: CANCELLED | OUTPATIENT
Start: 2018-05-18

## 2018-05-18 RX ORDER — LANOLIN 100 %
OINTMENT (GRAM) TOPICAL
Status: CANCELLED | OUTPATIENT
Start: 2018-05-18

## 2018-05-18 RX ORDER — NALOXONE HYDROCHLORIDE 0.4 MG/ML
.1-.4 INJECTION, SOLUTION INTRAMUSCULAR; INTRAVENOUS; SUBCUTANEOUS
Status: CANCELLED | OUTPATIENT
Start: 2018-05-18

## 2018-05-18 RX ORDER — OXYCODONE HYDROCHLORIDE 5 MG/1
5 TABLET ORAL EVERY 4 HOURS PRN
Status: CANCELLED | OUTPATIENT
Start: 2018-05-18

## 2018-05-18 RX ORDER — IBUPROFEN 400 MG/1
800 TABLET, FILM COATED ORAL EVERY 6 HOURS PRN
Status: CANCELLED | OUTPATIENT
Start: 2018-05-18

## 2018-05-18 RX ORDER — OXYTOCIN 10 [USP'U]/ML
10 INJECTION, SOLUTION INTRAMUSCULAR; INTRAVENOUS
Status: CANCELLED | OUTPATIENT
Start: 2018-05-18

## 2018-05-18 RX ORDER — AMOXICILLIN 250 MG
2 CAPSULE ORAL 2 TIMES DAILY
Status: CANCELLED | OUTPATIENT
Start: 2018-05-18

## 2018-05-18 RX ORDER — LIDOCAINE HYDROCHLORIDE AND EPINEPHRINE 15; 5 MG/ML; UG/ML
3 INJECTION, SOLUTION EPIDURAL
Status: COMPLETED | OUTPATIENT
Start: 2018-05-18 | End: 2018-05-18

## 2018-05-18 RX ADMIN — SODIUM CHLORIDE, SODIUM LACTATE, POTASSIUM CHLORIDE, AND CALCIUM CHLORIDE: 600; 310; 30; 20 INJECTION, SOLUTION INTRAVENOUS at 13:00

## 2018-05-18 RX ADMIN — LIDOCAINE HYDROCHLORIDE AND EPINEPHRINE 75 MG: 15; 5 INJECTION, SOLUTION EPIDURAL at 16:34

## 2018-05-18 RX ADMIN — IBUPROFEN 800 MG: 400 TABLET ORAL at 21:34

## 2018-05-18 RX ADMIN — Medication 100 MCG: at 16:55

## 2018-05-18 RX ADMIN — FENTANYL CITRATE 100 MCG: 50 INJECTION, SOLUTION INTRAMUSCULAR; INTRAVENOUS at 15:19

## 2018-05-18 RX ADMIN — Medication 10 ML/HR: at 16:48

## 2018-05-18 RX ADMIN — ROPIVACAINE HYDROCHLORIDE 8 ML: 2 INJECTION, SOLUTION EPIDURAL; INFILTRATION; PERINEURAL at 16:40

## 2018-05-18 RX ADMIN — LIDOCAINE HYDROCHLORIDE 25 MG: 10 INJECTION, SOLUTION INFILTRATION; PERINEURAL at 16:31

## 2018-05-18 ASSESSMENT — PAIN SCALES - GENERAL: PAINLEVEL: MILD PAIN (3)

## 2018-05-18 NOTE — PROGRESS NOTES
Romelia is having irregular contractions about every 3-8 minutes.  Romelia rates her pain at a 3 on on 1-10 pain scale.  She describes them as mild cramping.  Is able to talk through the contractions.  T's category 1.

## 2018-05-18 NOTE — PROGRESS NOTES
Romelia arrived to Room 405 from the clinic during an appointment with Dr. Pardo for fetal monitoring.  External monitors applied.  VSS.  See nursing navigator and flowsheets for additional information.

## 2018-05-18 NOTE — PROGRESS NOTES
Patient continues to be painful with contractions.  Requesting epidural.  Dr. Pardo notified.  Patient status reported.  IV Lactated Ringers flush started.

## 2018-05-18 NOTE — PROGRESS NOTES
RT called to delivery.  Suctioned orally x1 and stimulated baby.  No further RT assistance needed at this time.

## 2018-05-18 NOTE — PROGRESS NOTES
#20 jelco started in left lower forearm per Sujata Dale RN.  Patient tolerated procedure with no difficulty.  Pitocin augmentation started per protocol.

## 2018-05-18 NOTE — MR AVS SNAPSHOT
After Visit Summary   5/18/2018    Romelia Bhandari    MRN: [id number]           Patient Information     Date Of Birth          1990        Visit Information        Provider Department      5/18/2018 9:30 AM Shirley Pardo MD [facility]        Today's Diagnoses     Encounter for supervision of normal first pregnancy in third trimester    -  1    Uterine size-date discrepancy in third trimester           Follow-ups after your visit        Your next 10 appointments already scheduled     May 25, 2018  9:30 AM CDT   ESTABLISHED PRENATAL with Shirley Pardo MD   [facility]    [address]  [place] [address]   [phone]            Jun 01, 2018  9:30 AM CDT   ESTABLISHED PRENATAL with Shirley Pardo MD   [facility]    [address]  [place] [address]   [phone]              Who to contact     If you have questions or need follow up information about today's clinic visit or your schedule please contact [facility] directly at [phone].  Normal or non-critical lab and imaging results will be communicated to you by [employer], letter or phone within 4 business days after the clinic has received the results. If you do not hear from us within 7 days, please contact the clinic through [employer] or phone. If you have a critical or abnormal lab result, we will notify you by phone as soon as possible.  Submit refill requests through [employer] or call your pharmacy and they will forward the refill request to us. Please allow 3 business days for your refill to be completed.          Additional Information About Your Visit        MyChart Information     [employer] lets you send messages to your doctor, view your test results, renew your prescriptions, schedule appointments and more. To sign up, go to  [email or web address] . Click on \"Log in\" on the left side of the screen, which will take you to the Welcome page. Then click on \"Sign up Now\" on the right side of the page.     You will be asked to enter the access code listed below, as well as some personal information. Please follow the directions to create your username and password.     Your access code is: [email or web address]  Expires: 2018  5:10 PM     Your access code will  in 90 days. If you need help or a new code, please call your [facility] clinic or [phone].        Care EveryWhere ID     This is your Care EveryWhere ID. This could be used by other organizations to access your [facility] medical records  [id number]        Your Vitals Were     Pulse BMI (Body Mass Index)                74 24.34 kg/m2           Blood Pressure from Last 3 Encounters:   18 102/66   18 106/66   18 100/60    Weight from Last 3 Encounters:   18 64.3 kg (141 lb 12.8 oz)   18 64 kg (141 lb)   18 63.7 kg (140 lb 6 oz)              We Performed the Following     FETAL NON-STRESS TEST        Primary Care Provider Office Phone # Fax #    Dennise Marshall [phone]       [facility] [address] [place] [address]        Equal Access to Services     AWA HERNANDEZ AH: Hadii jeremías casey hadasho Sojoyce, waaxda luqadaha, qaybta kaalmada arvin, sneha jarquin. So [place] [phone].    ATENCIÓN: Si habla español, tiene a herrera disposición servicios gratuitos de asistencia lingüística. Antonio al [phone].    We comply with applicable federal civil rights laws and [place] laws. We do not discriminate on the basis of race, color, national origin, age, disability, sex, sexual orientation, or gender identity.            Thank you!     Thank you for choosing [facility] AND [facility]  for your care. Our goal is always to provide you with excellent care. Hearing back from our " patients is one way we can continue to improve our services. Please take a few minutes to complete the written survey that you may receive in the mail after your visit with us. Thank you!             Your Updated Medication List - Protect others around you: Learn how to safely use, store and throw away your medicines at www.disposemymeds.org.          This list is accurate as of 5/18/18 10:07 AM.  Always use your most recent med list.                   Brand Name Dispense Instructions for use Diagnosis    albuterol 108 (90 Base) MCG/ACT Inhaler    PROAIR HFA/PROVENTIL HFA/VENTOLIN HFA     Inhale 2 puffs into the lungs 4 times daily as needed for wheezing        breast pump Misc     1 each    Reason for need: Breastfeeding. Length of need: 1 year    Encounter for supervision of normal first pregnancy in third trimester       CVS PRENATAL 28-0.8 MG Tabs      Take 1 tablet by mouth daily

## 2018-05-18 NOTE — ANESTHESIA PROCEDURE NOTES
Peripheral nerve/Neuraxial procedure note : epidural catheter  Pre-Procedure  Performed by  CHRISTIAN JIMENEZ   Location: OB    Procedure Times:5/18/2018 4:26 PM and 5/18/2018 4:52 PM  Pre-Anesthestic Checklist: patient identified, IV checked, risks and benefits discussed, informed consent, monitors and equipment checked, pre-op evaluation and at physician/surgeon's request    Timeout  Correct Patient: Yes   Correct Procedure: Yes   Correct Site: Yes     Correct Position: Yes     .   Procedure Documentation    .    Procedure:    Epidural catheter.  Insertion Site:L3-4  (midline approach) Injection technique: LORT air   Local skin infiltrated with mL of 1% lidocaine.  SARABJIT at 4 cm     Patient Prep;chlorhexidine gluconate and isopropyl alcohol.  .  Needle: Touhy needle Needle Gauge: 17.    Needle Length (Inches) 3.5  # of attempts: 1 and # of redirects:  .   Catheter: 20 G . .  Catheter threaded easily  .  12 cm at skin.   .    Assessment/Narrative  Paresthesias: No.  .  .  Aspiration negative for heme or CSF  . Test dose of mL lidocaine 1.5% w/ 1:200,000 epinephrine at. Test dose negative for signs of intravascular, subdural or intrathecal injection.

## 2018-05-18 NOTE — PROGRESS NOTES
Return OB Visit    S: Patient is feeling okay, just more uncomfortable. Has been having more cramping and back pain this week. No VB or LOF but increased mucous discharge. +FM.     O: /66 (BP Location: Right arm, Patient Position: Chair, Cuff Size: Adult Regular)  Pulse 74  Wt 64.3 kg (141 lb 12.8 oz)  BMI 24.34 kg/m2  Gen: Well-appearing, NAD  See OB Flowsheet    NST: 140, mod variability, + accels, one late decel after 2 min contraction  [place]: q8 min ctx    A/P:  Romelia Bhandari is a 28 year old  at 38w0d by 7w0d US, here for return OB visit.   labor: now stable. S/p BMZ on  and   Plans breastfeeding- breast pump Rx 2018   Epidural  Mirena  Dr Patel for   S<D: had growth US with MFM, EFW 2095g (11%ile), normal BPP and UA dopplers. Per recs, start weekly  testing      PNC:   - Prenatal labs: A positive, RI, HIV NR, treponema NR, HepBsAg NR (scanned records). GCT 96. GBS negative (expires )  - Genetics: declines  - Imaging: dating US 7w0d (scanned records), normal anatomy  - Immunizations: flu 17. Tdap 3/7/2018   To L&D for prolonged monitoring, possible induction if remains category II. If cat I, f/u next week    Shirley Pardo MD  OB/GYN  2018 9:41 AM

## 2018-05-18 NOTE — PROGRESS NOTES
Patient is humble every 1 - 1 1/2 minutes.  Patient is painful with contractions.  Medication given per MD order.  See MAR.  Pitocin off.

## 2018-05-18 NOTE — L&D DELIVERY NOTE
Delivery Summary    Romelia Bhandari is a 28 year old  who was admitted at 38w0d for IOL for category II FHT. She was undergoing routine NST for fetal growth restriction and had late decelerations in clinic. Upon arrival, she was 3cm dilated. She underwent AROM and pitocin for labor induction. After epidural placement, she had prolonged decelerations that recovered with intrauterine resuscitation and phenylephrine. She was 8cm at the time and progressed spontaneously to complete. During the second stage of labor, she had recurrent late decelerations with lin of 70s and progressively prolonged recovery. She was recommended to undergo a vacuum-assisted delivery to expedite delivery for recurrent decelerations.     After discussion with the patient regarding risks, benefits and alternatives of vacuum assisted vaginal delivery we elected to proceed.  The indication for assisted delivery was fetal decelerations.  The patient was appropriately positioned in dorsal lithotomy.  The vacuum device was positioned appropriately at the flexion point. The fetal head delivered after 2 contractions with good expulsive efforts, and 0 pop-offs.  Maximum pressures during expulsive efforts as read on the vacuum device remained less than 600 mmHG delivering the fetal head.   The anterior shoulder was gently delivered with downward traction. The remainder of the baby was then delivered, the cord clamped and cut, and the baby placed on mother's abdomen. The perineum revealed no lacerations. However, she had a laceration of her left labia minora adjacent to the clitoral baron and extending toward the urethra but not involving the urethra. This was repaired with interrupted sutures of 4-0 vicryl.  EBL was 200.  Apgars were 7/8. Weight 6lb 1oz.  Maternal blood type is A+. Mother and baby are currently stable in the postpartum unit.  No complications occurred.        Romelia Bhandari MRN# [id number]   Age: 28 year old Date of Birth:  1990     Labor Event Times:    Labor Onset Date       Labor Onset Time    Dilation Complete Date    Dilation Complete Time       Start Pushing Date        Start Pushing Time            Labor Length:    1st Stage (hrs/min)     2nd Stage (hrs/min)     3rd Stage (hrs/min)         Labor Events:     Labor    Rupture Date     Rupture Time     Rupture Type Spontaneous rupture of membranes prior to induction   Fluid Color     Labor Type     Induction    Induction Indication         Augmentation    Labor Complications     Additional Complications     Management of Labor        Antibiotics     IV Antibiotic Given     Additional Management     Fetal Status Prior to  Delivery     Fetal Status Comments         Cervical Ripening:    Date     Time     Type         Delivery:    Episiotomy None   Local Anesthetic        Lacerations None   Sponge Count Correct       Needle Count Correct     Final Count by:    Sutures     Blood loss (ml) 200   Packing Intentionally Left In     Number     Comments           Information for the patient's :  Mae Bhandari [[id number]]       Delivery  2018 6:23 PM by  Vaginal, Vacuum (Extractor)  Sex:  female Gestational Age: 38w0d  Delivery Clinician:     Living?:            APGARS  One minute Five minutes Ten minutes   Skin color:            Heart rate:            Grimace:            Muscle tone:            Breathing:            Totals:              Presentation/position:           Resuscitation and Interventions: Method:     Oxygen Type:     Intubation Time:   # of Attempts:     ETT Size:        Tracheal Suction:     Tracheal returns:      [place] Care at Delivery:           Cord information:     Disposition of cord blood:      Blood gases sent?    Complications:     Placenta: Delivered:           appearance.  Comments:  .  Disposition: Pathology   Measurements:  Weight:    Height:    Head circumference:    Chest circumference:     Temperature:     Other  providers:       Additional  information:  Forceps:    Verbal Informed Consent Obtained:       Alternative Labor Strategies Discussed:     Emergency Resources Available:       Type:       Accrued Pulling Time:       # of Pulls:      Position:     Fetal Station:       Indications:      Other Indications:     Operative Vaginal Delivery Brief Note Forceps:        Vacuum:    Verbal Informed Consent Obtained:  Yes  Alternative Labor Strategies Discussed:  Yes  Emergency Resources Available:  Charge Nurse/Team  Resuscitation Team  Type: [place]    Accrued Pulling Time:  70 sec    # of Pop-Offs: 0     # of Pulls:  2    Position:  OA  Fetal Station:  +3   Indications for Vacuum:  Fetal Status    Other Indications:    Operative Vaginal Delivery Brief Note Vacuum:        Shoulder Dystocia Shoulder Dystocia    Fetal Tracing Prior to Delivery:  Category 2   Shoulder dystocia present?:  No                                            Breech:       : Type:     Indications for Primary:     Indications for Secondary:     Other Indications:        Observed anomalies     Output in Delivery Room:

## 2018-05-18 NOTE — PROGRESS NOTES
Patient up to the bathroom.  Felt a small gush of fluid.  Noticed bloody, clear fluid dripping down her leg per patient.  Patient settled back to bed.

## 2018-05-18 NOTE — IP AVS SNAPSHOT
MRN:[id number]                      After Visit Summary   5/18/2018    Romelia Bhandari    MRN: [id number]           Thank you!     Thank you for choosing [facility] for your care. Our goal is always to provide you with excellent care. Hearing back from our patients is one way we can continue to improve our services. Please take a few minutes to complete the written survey that you may receive in the mail after you visit with us. Thank you!        Patient Information     Date Of Birth          1990        Designated Caregiver       Most Recent Value    Caregiver    Will someone help with your care after discharge? yes    Name of designated caregiver Christiano    Phone number of caregiver same as patient    Caregiver address same as patient      About your hospital stay     You were admitted on:  May 18, 2018 You last received care in the:  [facility]    You were discharged on:  May 20, 2018        Reason for your hospital stay       Maternity care                  Who to Call     For medical emergencies, please call 911.  For non-urgent questions about your medical care, please call your primary care provider or clinic, [phone]          Attending Provider     Provider Specialty    Shirley Pardo MD OB/Gyn       Primary Care Provider Office Phone # Fax #    Dennise Marshall [phone]      After Care Instructions     Activity       Review discharge instructions            Diet       Resume previous diet            Discharge Instructions - Hypertensive disorders patients       High Blood pressure patients to follow up in clinic or via home care within one week for a blood pressure check            Discharge Instructions - Postpartum visit       Schedule postpartum visit with your provider and return to clinic in 6 weeks.                  Follow-up Appointments     Follow Up and recommended labs and tests       Follow up with primary care providerPATRICIA  Edvin, within 6 weeks, for hospital follow- up. No follow up labs or test are needed.                  Your next 10 appointments already scheduled     May 21, 2018  1:00 PM CDT   Consult HOD with  LACTATION NURSE   [facility]    [address]  [place] [address]   [phone]            2018 10:45 AM CDT   Post Partum with Shirley Pardo MD   [facility]    [address]  [place] [address]   [phone]              Further instructions from your care team       Vaginal Delivery or  Birth   Discharge Instructions    Activity: Go back to your normal activities    Diet: You may eat a regular diet. Drink plenty of fluids.      Call your Doctor  if you have any of these symptoms:    * You soak a sanitary pad with blood within 1 hour, or you see clots larger than a  golf ball.    * Bleeding that lasts more than 6 weeks.     *Bad-smelling fluid that comes out of your vagina.    *A fever above 100.4 degrees Fahrenheit (38.4 degrees Celsius) with or without chills.    * Severe pain, cramping, or tenderness in your lower belly area.    * Increased pain, swelling, redness or fluid around your stitches.    * A need to urinate more frequently (use the toilet more often), more urgently (use the toilet very quickly), or it burns when you urinate.    * Redness, swelling, or pain around a vein in your leg.    * Problems coping with sadness, anxiety, or depression.    * Problems breastfeeding, or a red or painful area on your breast.    * You have questions or concerns after you return home.      [facility]: [phone]    Pending Results     Date and Time Order Name Status Description    2018 1250 Treponema pallidum antibody confirm In process             Statement of Approval     Ordered          18 0855  I have reviewed and agree  "with all the recommendations and orders detailed in this document.  EFFECTIVE NOW     Approved and electronically signed by:  Gómez Bonilla MD             Admission Information     Date & Time Provider Department Dept. Phone    5/18/2018 Shirley Pardo MD [facility] [phone]      Your Vitals Were     Blood Pressure Pulse Temperature Respirations Height Weight    119/59 81 98.1  F (36.7  C) 18 1.626 m (5' 4\") 64 kg (141 lb)    Pulse Oximetry BMI (Body Mass Index)                97% 24.2 kg/m2          MyChart Information     [employer] gives you secure access to your electronic health record. If you see a primary care provider, you can also send messages to your care team and make appointments. If you have questions, please call your primary care clinic.  If you do not have a primary care provider, please call [phone] and they will assist you.        Care EveryWhere ID     This is your Care EveryWhere ID. This could be used by other organizations to access your [facility] medical records  [id number]        Equal Access to Services     AWA HERANNDEZ : Hadii jeremías lópez Sojoyce, waaxda luqadaha, qaybta kaalmaemilia sheridan, sneha catalan . So [facility] [phone].    ATENCIÓN: Si habla español, tiene a herrera disposición servicios gratuitos de asistencia lingüística. Llame al [phone].    We comply with applicable federal civil rights laws and [place] laws. We do not discriminate on the basis of race, color, national origin, age, disability, sex, sexual orientation, or gender identity.               Review of your medicines      CONTINUE these medicines which have NOT CHANGED        Dose / Directions    albuterol 108 (90 Base) MCG/ACT Inhaler   Commonly known as:  PROAIR HFA/PROVENTIL HFA/VENTOLIN HFA        Dose:  2 puff   Inhale 2 puffs into the lungs 4 times daily as needed for wheezing   Refills:  0       breast pump Misc   Used for:  Encounter for " supervision of normal first pregnancy in third trimester        Reason for need: Breastfeeding. Length of need: 1 year   Quantity:  1 each   Refills:  0       CVS PRENATAL 28-0.8 MG Tabs        Dose:  1 tablet   Take 1 tablet by mouth daily   Refills:  0                Protect others around you: Learn how to safely use, store and throw away your medicines at www.disposemymeds.org.             Medication List: This is a list of all your medications and when to take them. Check marks below indicate your daily home schedule. Keep this list as a reference.      Medications           Morning Afternoon Evening Bedtime As Needed    albuterol 108 (90 Base) MCG/ACT Inhaler   Commonly known as:  PROAIR HFA/PROVENTIL HFA/VENTOLIN HFA   Inhale 2 puffs into the lungs 4 times daily as needed for wheezing                                breast pump Misc   Reason for need: Breastfeeding. Length of need: 1 year                                CVS PRENATAL 28-0.8 MG Tabs   Take 1 tablet by mouth daily

## 2018-05-18 NOTE — PROGRESS NOTES
"Intrapartum Progress Note    S: Patient is getting comfortable with epidural, feeling rectal pressure with contractions.     O: /61  Pulse 79  Temp 98.6  F (37  C) (Oral)  Resp 18  Ht 1.626 m (5' 4\")  Wt 64 kg (141 lb)  SpO2 99%  BMI 24.2 kg/m2   Gen: resting in bed, NAD  Cvx: 8/90/0    FHT: 135, mod variability, no accels, prolonged decel with hypotension after epidural placement, followed by early decels    [place]: 4-5 contractions in 10 min    Membranes: SROM?- scant fluid    A/P: 28 year old  at 38w0d by 7w0d US admitted for IOL for category II FHT at term  FWB: Cat II after epidural placement, resolved with position changes, oxygen, fluids. EFW 6lbs  Labor: s/p AROM, pitocin stopped and ctx regularly  Pain: epidural  GBS: negative  Rh: positive  Rubella: immune  Continue routine labor management.   Anticipate     Shirley Pardo MD 5:05 PM    "

## 2018-05-18 NOTE — PROGRESS NOTES
Patient is requesting to take a whirlpool tub soak.  Dr. Pardo notified.  Patient is allowed to take a 30 minute whirlpool tub soak and be off the monitor per MD.

## 2018-05-18 NOTE — H&P
[facility] Labor and Delivery History and Physical    Romelia Bhandari MRN# [id number]   Age: 28 year old YOB: 1990     Date of Admission:  2018    Primary care provider: Dennise Marshall           Chief Complaint:   Romelia Bhandari is a 28 year old  at 38w0d by 7w0d US admitted for IOL for category II FHT at term. She had a routine NST this AM for fetal growth restriction and had one late deceleration. She was initially admitted for prolonged monitoring and in the two hours since arrival, has had three subsequent decelerations. She has otherwise been feeling well.           Pregnancy history:     OBSTETRIC HISTORY:    Obstetric History       T0      L0     SAB0   TAB0   Ectopic0   Multiple0   Live Births0       # Outcome Date GA Lbr [place]wey/2nd Weight Sex Delivery Anes PTL Lv   1 Current                   EDC: Estimated Date of Delivery: 2018    Prenatal Labs: Lab Results   Component Value Date    TREPAB Negative 2018    HGB 11.4 (L) 2018       GBS Status:   negative    Active Problem List  Patient Active Problem List   Diagnosis     Encounter for triage in pregnant patient     Fetal distress affecting care       Medication Prior to Admission  Prescriptions Prior to Admission   Medication Sig Dispense Refill Last Dose     albuterol (PROAIR HFA/PROVENTIL HFA/VENTOLIN HFA) 108 (90 BASE) MCG/ACT Inhaler Inhale 2 puffs into the lungs 4 times daily as needed for wheezing   Taking     Misc. Devices (BREAST PUMP) MISC Reason for need: Breastfeeding. Length of need: 1 year 1 each 0 Taking     Prenatal Vit-Fe Fumarate-FA (CVS PRENATAL) 28-0.8 MG TABS Take 1 tablet by mouth daily   Taking   .        Maternal Past Medical History:     Past Medical History:   Diagnosis Date     H/O abnormal cervical Papanicolaou smear      H/O LEEP      Past Surgical History:   Procedure Laterality Date     EXTRACTION(S) DENTAL      age 16,no anesthetic  "problems                       Family History:   History reviewed. No pertinent family history.              Social History:     Social History   Substance Use Topics     Smoking status: Never Smoker     Smokeless tobacco: Never Used     Alcohol use No            Review of Systems:   The Review of Systems is negative other than noted in the HPI          Physical Exam:   Patient Vitals for the past 8 hrs:   BP Temp Temp src Pulse Resp SpO2 Height Weight   18 1131 133/89 - - 75 18 - - -   18 1034 122/75 98.6  F (37  C) Oral 79 18 99 % 1.626 m (5' 4\") 64 kg (141 lb)     Gen: resting in bed, NAD  CV: RRR  Resp: CTAB  Abd: gravid, soft, nontender  Ext: nontender, no edema    Cervix: 3/60/-1  Membranes: intact  EFW: 6 lbs  Presentation:Cephalic    Fetal Heart Rate Tracin, mod variability, + accels, intermittent late and variable decels  Tocometer: 2-4 ctx in 10 min        Assessment:   Romelia Bhandari is a 28 year old  at 38w0d admitted for IOL for category II FHT at term.          Plan:   Labor: cervix favorable, will start with AROM and pitocin  FWB: intermittent category II with majority category I and reactive. EFW 6 lbs  Rh positive, RI, GBS negative  Anticipate     Shirley Pardo MD       "

## 2018-05-18 NOTE — ANESTHESIA PREPROCEDURE EVALUATION
Anesthesia Evaluation     .             ROS/MED HX    ENT/Pulmonary: Comment: Intermittent bronchial spasms.  - neg pulmonary ROS     Neurologic:  - neg neurologic ROS     Cardiovascular:  - neg cardiovascular ROS       METS/Exercise Tolerance:  >4 METS   Hematologic:  - neg hematologic  ROS       Musculoskeletal:  - neg musculoskeletal ROS       GI/Hepatic:  - neg GI/hepatic ROS       Renal/Genitourinary:  - ROS Renal section negative       Endo:  - neg endo ROS       Psychiatric:  - neg psychiatric ROS       Infectious Disease:  - neg infectious disease ROS       Malignancy:      - no malignancy   Other:    (+) Possibly pregnant   - neg other ROS                 Physical Exam  Normal systems: cardiovascular, pulmonary and dental    Airway   Mallampati: I  TM distance: >3 FB  Neck ROM: full    Dental     Cardiovascular   Rhythm and rate: regular and normal      Pulmonary           neg OB ROS                 Anesthesia Plan      History & Physical Review      ASA Status:  2 .  OB Epidural Asa: 2   NPO Status:  > 6 hours         Postoperative Care      Consents  Anesthetic plan, risks, benefits and alternatives discussed with:  Patient and Patient..                          .

## 2018-05-18 NOTE — IP AVS SNAPSHOT
[facility]    [address]    [place] [address]    Phone:  [phone]    Fax:  [phone]                                       After Visit Summary   5/18/2018    Romelia Bhandari    MRN: [id number]           After Visit Summary Signature Page     I have received my discharge instructions, and my questions have been answered. I have discussed any challenges I see with this plan with the nurse or doctor.    ..........................................................................................................................................  Patient/Patient Representative Signature      ..........................................................................................................................................  Patient Representative Print Name and Relationship to Patient    ..................................................               ................................................  Date                                            Time    ..........................................................................................................................................  Reviewed by Signature/Title    ...................................................              ..............................................  Date                                                            Time

## 2018-05-19 LAB
RPR SER QL: NONREACTIVE
T PALLIDUM AB SER QL: REACTIVE

## 2018-05-19 PROCEDURE — 12000027 ZZH R&B OB

## 2018-05-19 PROCEDURE — 25000132 ZZH RX MED GY IP 250 OP 250 PS 637: Performed by: OBSTETRICS & GYNECOLOGY

## 2018-05-19 PROCEDURE — 72200001 ZZH LABOR CARE VAGINAL DELIVERY SINGLE

## 2018-05-19 RX ORDER — IBUPROFEN 600 MG/1
600 TABLET, FILM COATED ORAL EVERY 6 HOURS PRN
Status: DISCONTINUED | OUTPATIENT
Start: 2018-05-19 | End: 2018-05-20 | Stop reason: HOSPADM

## 2018-05-19 RX ADMIN — IBUPROFEN 600 MG: 600 TABLET, FILM COATED ORAL at 16:38

## 2018-05-19 RX ADMIN — IBUPROFEN 600 MG: 600 TABLET, FILM COATED ORAL at 07:47

## 2018-05-19 NOTE — PROGRESS NOTES
[facility]    Post-Partum Progress Note    Assessment & Plan   Assessment:  Post-partum day #1  Vacuum extraction    Doing well.  No excessive bleeding    Plan:  Ambulation encouraged  Lactation consultation  Pain control measures as needed  Anticipate discharge tomorrow    Gómez Bonilla     Interval History   Doing well.  Pain is adequately controlled with ibuprofen.  No fevers.  No history of foul-smelling vaginal discharge.  Good appetite. + flatus. No BM.  Denies chest pain, shortness of breath, nausea or vomiting.  Vaginal bleeding is similar to a heavy menstrual flow.  Ambulating and voiding well.  Breastfeeding well. Would like to meet with a lactation consultant--nursing notified.    Medications     fentaNYL-ropivacaine 10 mL/hr (05/18/18 1648)     lactated ringers 999 mL/hr at 05/18/18 1556     - MEDICATION INSTRUCTIONS -       - MEDICATION INSTRUCTIONS -       - MEDICATION INSTRUCTIONS -       - MEDICATION INSTRUCTIONS -       oxytocin in 0.9% NaCl       oxytocin in 0.9% NaCl 2 bere-units/min (05/18/18 1545)       lactated ringers  1,000 mL Intravenous Once     sodium chloride (PF)  3 mL Intracatheter Q8H       Physical Exam   Temp: 99.4  F (37.4  C) Temp src: Oral BP: 128/66 Pulse: 70   Resp: 18 SpO2: 99 %      Vitals:    05/18/18 1034   Weight: 64 kg (141 lb)     Vital Signs with Ranges  Temp:  [98.6  F (37  C)-99.4  F (37.4  C)] 99.4  F (37.4  C)  Pulse:  [70-79] 70  Resp:  [18] 18  BP: (102-133)/(61-89) 128/66  SpO2:  [99 %] 99 %  I/O last 3 completed shifts:  In: -   Out: 200 [Blood:200]    Peripheral IV 05/18/18 Right Upper arm;Lower forearm (Active)   Site Assessment WDL 5/19/2018  3:00 AM   Line Status Saline locked 5/19/2018  3:00 AM   Phlebitis Scale 0-->no symptoms 5/19/2018  3:00 AM   Infiltration Scale 0 5/19/2018  3:00 AM   Extravasation? No 5/19/2018  3:00 AM   Number of days:1       Intrathecal/Epidural Catheter 05/18/18 (Active)   Number of days:1        Intrathecal/Epidural Catheter 05/18/18 (Active)   Number of days:1     No line/device    Uterine fundus is firm, non-tender and at the level of the umbilicus  Extremities Non-tender    Data   Recent Labs   Lab Test  05/18/18   1250   ABO  A   RH  Pos   AS  Neg     Recent Labs   Lab Test  05/18/18   1250  03/07/18   1700   HGB  12.5  11.4*     No lab results found.  2

## 2018-05-19 NOTE — PROGRESS NOTES
Up to BR with standby assist.  Voided qs. Showered .  Denies dizziness , faintness when up  Instructed to nata care. Pt able to demonstrate nata care.

## 2018-05-19 NOTE — PLAN OF CARE
Problem: Postpartum (Vaginal Delivery) (Adult,Obstetrics,Pediatric)  Goal: Signs and Symptoms of Listed Potential Problems Will be Absent, Minimized or Managed (Postpartum)  Signs and symptoms of listed potential problems will be absent, minimized or managed by discharge/transition of care (reference Postpartum (Vaginal Delivery) (Adult,Obstetrics,Pediatric) CPG).  Outcome: Improving   18 1656   Postpartum (Vaginal Delivery)   Problems Assessed (Postpartum Vaginal Delivery) all   Problems Present (Postpartum Vag Deliv) none   Assessments completed as charted. B/P: 124/83, T: 98.8, P: 85, R: 18. Rates pain: 3/10 reports cramping with breastfeeding. Voiding without difficulty. Fundus: Midline and firm. Lochia: Light. Activity: unrestricted with out pain  and normal activity. Infant feeding: Breast feeding going with slight challenges as babe is too sleepy to nurse vigorously.  Education provided regarding routine, hand expression, electric pump use.  Was able to pump 4 1/2 mL's breastmilk, syringe feed 1mL to  and the rest is stored in the fridge.   Parents feel very encouraged with successful pumping and syringe feeding.    LATCH Score:   5  Latch: 1 - Repeated Attempts  Audible Swallowin - None  Type of Nipple: (Breast/Nipple) 2 - Everted  Comfort: 2 - Soft, Nontender  Hold: 0 - Full Assist   Total LATCH Score:   5    Postpartum breastfeeding assessment completed and education provided, see Patient Education Activity.  Items included in the education are:     proper positioning and latch    effectiveness of feeding    manual expression    handling and storing breastmilk    maintenance of breastfeeding for the first 6 months    sign/symptoms of infant feeding issues requiring referral to qualified health care provider  Postpartum care education provided, see Patient Education activity. Patient denies needs. Will monitor.  Alex Oro

## 2018-05-20 VITALS
DIASTOLIC BLOOD PRESSURE: 59 MMHG | HEIGHT: 64 IN | OXYGEN SATURATION: 97 % | BODY MASS INDEX: 24.07 KG/M2 | SYSTOLIC BLOOD PRESSURE: 119 MMHG | HEART RATE: 81 BPM | WEIGHT: 141 LBS | RESPIRATION RATE: 18 BRPM | TEMPERATURE: 98.1 F

## 2018-05-20 PROBLEM — Z36.89 ENCOUNTER FOR TRIAGE IN PREGNANT PATIENT: Status: RESOLVED | Noted: 2018-04-24 | Resolved: 2018-05-20

## 2018-05-20 PROCEDURE — 25000132 ZZH RX MED GY IP 250 OP 250 PS 637: Performed by: OBSTETRICS & GYNECOLOGY

## 2018-05-20 RX ADMIN — IBUPROFEN 600 MG: 600 TABLET, FILM COATED ORAL at 08:49

## 2018-05-20 NOTE — PLAN OF CARE
Patient with increased pain in labor.  Rates 10/10.  Requesting and received epidural per CRNA  Time out procedures completed .  Patient tolerated well and reported good  pain relief.

## 2018-05-20 NOTE — ANESTHESIA POSTPROCEDURE EVALUATION
Patient: Romelia Bhandari    * No procedures listed *    Diagnosis:* No pre-op diagnosis entered *  Diagnosis Additional Information: No value filed.    Anesthesia Type:  No value filed.    Note:  Anesthesia Post Evaluation    Patient location during evaluation: Bedside and Floor  Patient participation: Able to fully participate in evaluation  Level of consciousness: awake and alert  Pain management: adequate  Airway patency: patent  Cardiovascular status: acceptable  Respiratory status: acceptable  Hydration status: acceptable  PONV: none       Comments: Patient was happy with her epidural.  She has been up waking around.  She has no residual numbness or tingling.  She has been able to void without difficulty.  No fever or chills.         Last vitals:  Vitals:    05/19/18 1631 05/20/18 0045 05/20/18 0848   BP: 124/83  119/59   Pulse:  81    Resp:  18    Temp:  98  F (36.7  C) 98.1  F (36.7  C)   SpO2:  98% 97%         Electronically Signed By: JAMEEL MAGDALENO CRNA  May 20, 2018  9:35 AM

## 2018-05-20 NOTE — PLAN OF CARE
Problem: Postpartum (Vaginal Delivery) (Adult,Obstetrics,Pediatric)  Goal: Signs and Symptoms of Listed Potential Problems Will be Absent, Minimized or Managed (Postpartum)  Signs and symptoms of listed potential problems will be absent, minimized or managed by discharge/transition of care (reference Postpartum (Vaginal Delivery) (Adult,Obstetrics,Pediatric) CPG).   Outcome: Therapy, progress toward functional goals as expected   18 0439   Postpartum (Vaginal Delivery)   Problems Assessed (Postpartum Vaginal Delivery) all   Problems Present (Postpartum Vag Deliv) none   Assessments completed as charted. B/P: 124/83, T: 98, P: 81, R: 18. Rates pain: 0/10 . Voiding without difficulty. Fundus: Midline U/1. Lochia: Moderate. Activity: unrestricted with out pain . Infant feeding: Breast feeding going well.     LATCH Score:   Latch: 2 - Good Latch  Audible Swallowin - Spontaneous & frequent  Type of Nipple: (Breast/Nipple) 2 - Everted  Comfort: 2 - Soft, Nontender  Hold: 2 - No Assist   Total LATCH Score: 10    Postpartum breastfeeding assessment completed and education provided, see Patient Education Activity.  Items included in the education are:     proper positioning and latch    effectiveness of feeding    manual expression    handling and storing breastmilk    maintenance of breastfeeding for the first 6 months    sign/symptoms of infant feeding issues requiring referral to qualified health care provider  Postpartum care education provided, see Patient Education activity. Patient denies needs. Will monitor.  Daysi Najera

## 2018-05-20 NOTE — PLAN OF CARE
Patient discharged at 1105 AM via ambulation accompanied by spouse and staff. Prescriptions - None ordered for discharge. All belongings sent with patient.     Discharge instructions reviewed with Romelia and her spouse. Listed belongings gathered and returned to patient. Family will return tomorrow 5/21/2018 to have hearing screen completed due to equipment failure.    Patient discharged to home via private vehicle.     Core Measures and Vaccines  Core Measures applicable during stay: None  Pneumonia and Influenza given prior to discharge, if indicated: N/A    Surgical Patient   Surgical Procedures during stay: None  Did patient receive discharge instruction on wound care and recognition of infection symptoms? N/A    MISC  Follow up appointment made:  Yes  Home and hospital aquired medications returned to patient: N/A  Patient reports pain was well managed at discharge: Yes

## 2018-05-20 NOTE — PLAN OF CARE
Problem: Postpartum (Vaginal Delivery) (Adult,Obstetrics,Pediatric)  Goal: Signs and Symptoms of Listed Potential Problems Will be Absent, Minimized or Managed (Postpartum)  Signs and symptoms of listed potential problems will be absent, minimized or managed by discharge/transition of care (reference Postpartum (Vaginal Delivery) (Adult,Obstetrics,Pediatric) CPG).   Outcome: Improving   18 0857   Postpartum (Vaginal Delivery)   Problems Assessed (Postpartum Vaginal Delivery) all   Problems Present (Postpartum Vag Deliv) none   Assessments completed as charted. B/P: 119/59, T: 98.1, P: 81, R: 18. Rates pain: 0/10. Voiding without difficulty. Fundus: Midline and firm. Lochia: Light. Activity: unrestricted with out pain  and normal activity. Infant feeding: Breast feeding going well.     LATCH Score:   Latch: 2 - Good Latch  Audible Swallowin - Spontaneous & frequent  Type of Nipple: (Breast/Nipple) 2 - Everted  Comfort: 2 - Soft, Nontender  Hold: 2 - No Assist   Total LATCH Score:   10    Postpartum breastfeeding assessment completed and education provided, see Patient Education Activity.  Items included in the education are:     proper positioning and latch    effectiveness of feeding    manual expression    handling and storing breastmilk    maintenance of breastfeeding for the first 6 months    sign/symptoms of infant feeding issues requiring referral to qualified health care provider  Postpartum care education provided, see Patient Education activity. Patient denies needs. Will monitor.  Alex Oro

## 2018-05-20 NOTE — PROGRESS NOTES
Epidural placed , see CRNA note.  Fetal heart tones down 60's - 70's with prolonged deceleration after epidural.    Maternal /53.  Dr. Pardo present and aware.   Patient was turned to left, then right , oxygen on per non rebreather mask at 10 L. Phenylepherine given 1 ml.   Patient dilated 8 cm. IV fluids bolusing.  Recovery to baseline  After 10 minutes .

## 2018-05-20 NOTE — DISCHARGE INSTRUCTIONS
Vaginal Delivery or  Birth   Discharge Instructions    Activity: Go back to your normal activities    Diet: You may eat a regular diet. Drink plenty of fluids.      Call your Doctor  if you have any of these symptoms:    * You soak a sanitary pad with blood within 1 hour, or you see clots larger than a  golf ball.    * Bleeding that lasts more than 6 weeks.     *Bad-smelling fluid that comes out of your vagina.    *A fever above 100.4 degrees Fahrenheit (38.4 degrees Celsius) with or without chills.    * Severe pain, cramping, or tenderness in your lower belly area.    * Increased pain, swelling, redness or fluid around your stitches.    * A need to urinate more frequently (use the toilet more often), more urgently (use the toilet very quickly), or it burns when you urinate.    * Redness, swelling, or pain around a vein in your leg.    * Problems coping with sadness, anxiety, or depression.    * Problems breastfeeding, or a red or painful area on your breast.    * You have questions or concerns after you return home.      [facility]: [phone]

## 2018-05-20 NOTE — PROGRESS NOTES
[facility]    Post-Partum Progress Note    Assessment & Plan   Assessment:  Post-partum day #2  Vacuum extraction    Doing well.    Plan:  Discharge later today    Gómez Bonilla     Interval History   Doing well.  Pain is adequately controlled.  No fevers.  No history of foul-smelling vaginal discharge.  Good appetite. No BM.  Denies chest pain, shortness of breath, nausea or vomiting.  Vaginal bleeding is similar to a heavy menstrual flow.  Breastfeeding well.    Medications     - MEDICATION INSTRUCTIONS -       oxytocin in 0.9% NaCl           Physical Exam   Temp: 98  F (36.7  C) Temp src: Oral BP: 124/83 Pulse: 81   Resp: 18 SpO2: 98 % O2 Device: None (Room air)    Vitals:    05/18/18 1034   Weight: 64 kg (141 lb)     Vital Signs with Ranges  Temp:  [98  F (36.7  C)-98.8  F (37.1  C)] 98  F (36.7  C)  Pulse:  [81-85] 81  Resp:  [18] 18  BP: (121-124)/(58-83) 124/83  SpO2:  [97 %-98 %] 98 %       Intrathecal/Epidural Catheter 05/18/18 (Active)   Number of days:2     No line/device    Uterine fundus is firm, non-tender and at the level of the umbilicus  Extremities Non-tender    Data   Recent Labs   Lab Test  05/18/18   1250   ABO  A   RH  Pos   AS  Neg     Recent Labs   Lab Test  05/18/18   1250  03/07/18   1700   HGB  12.5  11.4*     No lab results found.

## 2018-05-20 NOTE — PROGRESS NOTES
IV pitocin off.  Patient having ctx every 1-3 min.  Note intermittent variable decels  With periods of minimal to moderate variability.  Pt. Uncomfortable and requesting epidural .

## 2018-05-20 NOTE — DISCHARGE SUMMARY
[facility]    Discharge Summary  Obstetrics    Date of Admission:  2018  Date of Discharge:  2018  Discharging Provider: Gómez Bonilla    Discharge Diagnoses    Non-reassuring fetal tracing  Term pregnancy, delivered.    History of Present Illness   Romelia Bhandari is a 28 year old female who presented with fetal growth restriction and a category II fetal tracing. She was admitted for elective induction of labor at term. Labor course was unremarkable and delivery was by vacuum extraction. A labial laceration was repaired.    Hospital Course   The patient's hospital course was unremarkable.  She recovered as anticipated and experienced no post-delivery complications. On discharge, her pain was well controlled with ibuprofen.  Vaginal bleeding is similar to peak menstrual flow.  Voiding without difficulty.  Ambulating well and tolerating a normal diet.  No fevers.  Breastfeeding is going well.  Infant is stable.  She was discharged on post-partum day #2. She plans on getting an IUD.    Post-partum hemoglobin:   Hemoglobin   Date Value Ref Range Status   2018 12.5 11.7 - 15.7 g/dL Final       Gómez Bonilla    Discharge Disposition   Discharged to home   Condition at discharge: Stable    Primary Care Physician   Dennise Marshall    Consultations This Hospital Stay   ANESTHESIOLOGY IP CONSULT  HOME CARE POST PARTUM/ IP CONSULT  LACTATION IP CONSULT    Discharge Orders     Activity   Review discharge instructions     Reason for your hospital stay   Maternity care     Follow Up and recommended labs and tests   Follow up with primary care provider, PATRICIA Pardo, within 6 weeks, for hospital follow- up. No follow up labs or test are needed.     Discharge Instructions - Postpartum visit   Schedule postpartum visit with your provider and return to clinic in 6 weeks.     Discharge Instructions - Hypertensive disorders patients   High Blood pressure patients to follow up in  clinic or via home care within one week for a blood pressure check     Diet   Resume previous diet       Discharge Medications   Current Discharge Medication List      CONTINUE these medications which have NOT CHANGED    Details   albuterol (PROAIR HFA/PROVENTIL HFA/VENTOLIN HFA) 108 (90 BASE) MCG/ACT Inhaler Inhale 2 puffs into the lungs 4 times daily as needed for wheezing      Misc. Devices (BREAST PUMP) MISC Reason for need: Breastfeeding. Length of need: 1 year  Qty: 1 each, Refills: 0    Associated Diagnoses: Encounter for supervision of normal first pregnancy in third trimester      Prenatal Vit-Fe Fumarate-FA ([employer] PRENATAL) 28-0.8 MG TABS Take 1 tablet by mouth daily           Allergies   Allergies   Allergen Reactions     Penicillins Rash

## 2018-05-21 ENCOUNTER — LACTATION ENCOUNTER (OUTPATIENT)
Age: 28
End: 2018-05-21

## 2018-05-21 ENCOUNTER — HOSPITAL ENCOUNTER (OUTPATIENT)
Dept: OBGYN | Facility: OTHER | Age: 28
End: 2018-05-21
Attending: OBSTETRICS & GYNECOLOGY
Payer: COMMERCIAL

## 2018-05-21 PROCEDURE — S9443 LACTATION CLASS: HCPCS | Performed by: REGISTERED NURSE

## 2018-05-21 NOTE — LACTATION NOTE
This note was copied from a baby's chart.  Outpatient Lactation Visit    Charis Bhandari  [id number]    Consultation Date: May 21, 2018     Reason for Lactation Referral: Initial Lactation Consult    Baby's : 2018    Baby's Current Age: 3 day old  Baby's Gestational Age: Gestational Age: 38w0d    Primary Care Provider: No primary care provider on file.    Presenting Problem (concerns as stated by parent): no concerns    MATERNAL HISTORY   History of Breast Surgery: no  Breast Changes During Pregnancy: no  Breast Feeding History: primigravida  Maternal Meds: daily prenatal vitamin  Pregnancy Complications: none  Anesthesia during labor: epidural    MATERNAL ASSESSMENT    Breast Size: average, symmetrical, soft after feeding and filling prior to feeding  Nipple Appearance - Left: slightly cracked, with signs of healing, education on further healing techniques provided  Nipple Appearance - Right: slightly cracked, with signs of healing, education on further healing techniques provided  Nipple Erectility - Left: erect with stimulation  Nipple Erectility - Right: erect with stimulation  Areolas Compressibility: soft  Nipple Size: average  Special Equipment Used: none  Day mother reports milk came in:  Day 3    INFANT ASSESSMENT    Oral Anatomy  Mouth: normal  Palate: normal  Jaw: normal  Tongue: normal  Frenulum: normal   Digital Suck Exam: root    FEEDING   Feeding Time: aggressively for 20 minutes  Position:  cradle  Effort to Latch: awake and alert, latched easily  Duration of Breast Feeding: Right Breast: 10; Left Breast: 10  Results: excellent breast feed    Volume of Intake:    Birth Weight: 6 lb 1.4 oz    Hospital discharge weight: 5 lb 11.8 oz    Today's Weight 5 lb 10.4 oz    Total Intake: 1.0 oz  Output: 7-8 soil diapers in last 24 hours, 4-5 wet diapers in last 24 hours    LATCH Score:   Latch: 2 - Good Latch  Audible Swallowin - Spontaneous & frequent  Type of Nipple: (Breast/Nipple) 2 -  Everted  Comfort: 2 - Soft, Nontender  Hold: 2 - No Assist   Total LATCH Score:  10    FEEDING PLAN    Home Feeding Plan: Continue to feed on demand when  elicits feeding cues with deep latch.  Babe should be eating 8-12 times in a 24 hour period.  Exclusivity explained and encouraged in the early weeks to establish breastfeeding and order in milk supply.  Rooming-in encouraged with explanation of the benefits.  Continue to apply expressed breast milk and Lanolin cream to nipples after feedings for healing and comfort.  Postpartum breastfeeding assessment completed and education provided.  Items included in the education are:     proper positioning and latch    effectiveness of feeding    manual expression    handling and storing breastmilk    maintenance of breastfeeding for the first 6 months    sign/symptoms of infant feeding issues requiring referral to qualified health care provider    LACTATION COMMENTS   Deep latch explained for proper positioning of breast in infant's mouth, maximizing milk transfer and comfort.  Reassurance and encouragement provided in regard to mom's concerns about milk supply.  Follow-up support information provided.  Parents plan to keep  Well-Child Check with Dr. Patel as scheduled for 2 week well child check.      Face-to-face Time: 60 minutes with assessment and education.    Mehreen Vargas  2018  1:06 PM

## 2018-05-23 LAB — T PALLIDUM AB SER QL AGGL: NORMAL

## 2018-05-25 DIAGNOSIS — Z11.3 SCREENING EXAMINATION FOR VENEREAL DISEASE: Primary | ICD-10-CM

## 2018-06-22 DIAGNOSIS — Z11.3 SCREENING EXAMINATION FOR VENEREAL DISEASE: ICD-10-CM

## 2018-06-22 PROCEDURE — 86592 SYPHILIS TEST NON-TREP QUAL: CPT | Performed by: OBSTETRICS & GYNECOLOGY

## 2018-06-22 PROCEDURE — 86780 TREPONEMA PALLIDUM: CPT | Performed by: OBSTETRICS & GYNECOLOGY

## 2018-06-22 PROCEDURE — 36415 COLL VENOUS BLD VENIPUNCTURE: CPT | Performed by: OBSTETRICS & GYNECOLOGY

## 2018-06-23 LAB
RPR SER QL: NONREACTIVE
T PALLIDUM AB SER QL: ABNORMAL

## 2018-06-26 LAB — T PALLIDUM AB SER QL AGGL: NORMAL

## 2018-06-29 ENCOUNTER — PRENATAL OFFICE VISIT (OUTPATIENT)
Dept: OBGYN | Facility: OTHER | Age: 28
End: 2018-06-29
Attending: OBSTETRICS & GYNECOLOGY
Payer: COMMERCIAL

## 2018-06-29 VITALS
WEIGHT: 128 LBS | SYSTOLIC BLOOD PRESSURE: 102 MMHG | HEART RATE: 68 BPM | BODY MASS INDEX: 21.97 KG/M2 | DIASTOLIC BLOOD PRESSURE: 64 MMHG

## 2018-06-29 DIAGNOSIS — Z12.4 SCREENING FOR MALIGNANT NEOPLASM OF CERVIX: ICD-10-CM

## 2018-06-29 DIAGNOSIS — Z30.019 ENCOUNTER FOR INITIAL PRESCRIPTION OF CONTRACEPTIVES, UNSPECIFIED CONTRACEPTIVE: ICD-10-CM

## 2018-06-29 LAB — HCG UR QL: NEGATIVE

## 2018-06-29 PROCEDURE — 25000125 ZZHC RX 250: Performed by: OBSTETRICS & GYNECOLOGY

## 2018-06-29 PROCEDURE — 58300 INSERT INTRAUTERINE DEVICE: CPT | Performed by: OBSTETRICS & GYNECOLOGY

## 2018-06-29 PROCEDURE — 99207 ZZC POST PARTUM EXAM: CPT | Mod: 25 | Performed by: OBSTETRICS & GYNECOLOGY

## 2018-06-29 PROCEDURE — 40001026 ZZHCL STATISTICAL PAP TEST QC: Performed by: OBSTETRICS & GYNECOLOGY

## 2018-06-29 PROCEDURE — 87624 HPV HI-RISK TYP POOLED RSLT: CPT | Performed by: OBSTETRICS & GYNECOLOGY

## 2018-06-29 PROCEDURE — 88142 CYTOPATH C/V THIN LAYER: CPT | Performed by: OBSTETRICS & GYNECOLOGY

## 2018-06-29 PROCEDURE — 88142 CYTOPATH C/V THIN LAYER: CPT | Mod: ZL | Performed by: OBSTETRICS & GYNECOLOGY

## 2018-06-29 PROCEDURE — 81025 URINE PREGNANCY TEST: CPT | Performed by: OBSTETRICS & GYNECOLOGY

## 2018-06-29 RX ADMIN — LEVONORGESTREL 1 EACH: 52 INTRAUTERINE DEVICE INTRAUTERINE at 11:30

## 2018-06-29 ASSESSMENT — PAIN SCALES - GENERAL: PAINLEVEL: NO PAIN (0)

## 2018-06-29 NOTE — NURSING NOTE
Patient presents today for her 6 week postpartum visit. She had a  on 18.  Barb Storm LPN  2018  10:54 AM           Prior to the start of the procedure and with procedural staff participation, I verbally confirmed the patient s identity using two indicators, relevant allergies, that the procedure was appropriate and matched the consent or emergent situation, and that the correct equipment/implants were available. Immediately prior to starting the procedure I conducted the Time Out with the procedural staff and re-confirmed the patient s name, procedure, and site/side. (The Joint Commission universal protocol was followed.)  Yes    Sedation (Moderate or Deep): None

## 2018-06-29 NOTE — MR AVS SNAPSHOT
After Visit Summary   6/29/2018    Romelia Bhandari    MRN: [id number]           Patient Information     Date Of Birth          1990        Visit Information        Provider Department      6/29/2018 10:45 AM Shirley Pardo MD [facility]        Today's Diagnoses     Routine postpartum follow-up    -  1    Encounter for initial prescription of contraceptives, unspecified contraceptive        Screening for malignant neoplasm of cervix           Follow-ups after your visit        Your next 10 appointments already scheduled     Jul 27, 2018  3:15 PM CDT   SHORT with Shirley Pardo MD   [facility]    [address]  [place] [id number] [phone]              Who to contact     If you have questions or need follow up information about today's clinic visit or your schedule please contact [facility] directly at [phone].  Normal or non-critical lab and imaging results will be communicated to you by BuildFaxhart, letter or phone within 4 business days after the clinic has received the results. If you do not hear from us within 7 days, please contact the clinic through BuildFaxhart or phone. If you have a critical or abnormal lab result, we will notify you by phone as soon as possible.  Submit refill requests through 3D Data or call your pharmacy and they will forward the refill request to us. Please allow 3 business days for your refill to be completed.          Additional Information About Your Visit        MyChart Information     3D Data gives you secure access to your electronic health record. If you see a primary care provider, you can also send messages to your care team and make appointments. If you have questions, please call your primary care clinic.  If you do not have a primary care provider, please call [phone] and they will assist you.        Care EveryWhere ID     This  is your Care EveryWhere ID. This could be used by other organizations to access your [facility] medical records  [id number]        Your Vitals Were     Pulse Breastfeeding? BMI (Body Mass Index)             68 Yes 21.97 kg/m2          Blood Pressure from Last 3 Encounters:   06/29/18 102/64   05/20/18 119/59   05/18/18 102/66    Weight from Last 3 Encounters:   06/29/18 58.1 kg (128 lb)   05/18/18 64 kg (141 lb)   05/18/18 64.3 kg (141 lb 12.8 oz)              We Performed the Following     HCG qualitative urine     HPV High Risk Types DNA Cervical     INSERTION INTRAUTERINE DEVICE     Pap Diagnostic Thin Prep with HPV (select HPV order below)        Primary Care Provider Office Phone # Fax #    Dennise Marshall [phone]       [facility] [address] [place] [address]        Equal Access to Services     [facility] : Hadii aad ku hadasho Sojoyce, waaxda luqadaha, qaybta kaalmada adeegyada, sneha catalan . So [facility] [phone].    ATENCIÓN: Si habla español, tiene a herrera disposición servicios gratuitos de asistencia lingüística. [facility] [phone].    We comply with applicable federal civil rights laws and [place] laws. We do not discriminate on the basis of race, color, national origin, age, disability, sex, sexual orientation, or gender identity.            Thank you!     Thank you for choosing [facility] [place]  for your care. Our goal is always to provide you with excellent care. Hearing back from our patients is one way we can continue to improve our services. Please take a few minutes to complete the written survey that you may receive in the mail after your visit with us. Thank you!             Your Updated Medication List - Protect others around you: Learn how to safely use, store and throw away your medicines at www.disposemymeds.org.          This list is accurate as of 6/29/18 11:35 AM.  Always use your most recent med  list.                   Brand Name Dispense Instructions for use Diagnosis    albuterol 108 (90 Base) MCG/ACT Inhaler    PROAIR HFA/PROVENTIL HFA/VENTOLIN HFA     Inhale 2 puffs into the lungs 4 times daily as needed for wheezing        CVS PRENATAL 28-0.8 MG Tabs      Take 1 tablet by mouth daily

## 2018-06-29 NOTE — PROGRESS NOTES
"6 week Postpartum Visit Note    S:  Ms. Romelia Bhandari is a 28 year old  here for her 6-week postpartum checkup.   - Had a VAVD on 18.  - Infant gender:  girl, weight 6 pounds 1 oz.  - Feeding Method:  .  Complications reported with feeding:  none, infant thriving .    - Bleeding:  None.  Duration:  Lochia stopped after 4 weeks.  Menses resumed:  No  - Bowel/Urinary problems:  No   - Mood: good  - Sleep: getting better  - Believes she was due for a pap in January    - Contraception Planned:  Mirena  - She  has not had intercourse since delivery..    - Current tobacco use:  No  - Hx of Abuse:  No  ================================================================  ROS: 10 point ROS neg other than the symptoms noted above in the HPI.     O:  /64 (BP Location: Right arm, Patient Position: Sitting, Cuff Size: Adult Regular)  Pulse 68  Wt 58.1 kg (128 lb)  Breastfeeding? Yes  BMI 21.97 kg/m2  Gen: Well-appearing, NAD  Psych:  Appropriate mood and affect  Breast: Declined, no concerns  Abd:  Benign, Soft, flat, non-tender and No masses, organomegaly  Pelvic: Well healed perineum. Normal vagina. Cervix with post-LEEP changes, otherwise normal. Uterus small, mobile, anteverted. No adnexal masses    Pap collected    IUD Insertion Note:      Time Out - \"Pause for the Cause\"  Just before the procedure begins, through verbal and active participation of team members, verify:    Initials   Patient Name    Romelia Bhandari    Patient Date of Birth 1990    Procedure to be performed  IUD insertion     Consent:  Risks, benefits of treatment, and no treatment were discussed.  Patient's questions were elicited and answered.     After informed consent was obtained from the patient, a speculum was placed in the vagina to visualized the cervix.  The cervix was then swabbed with a betadine prep x 3.   Tenaculum was placed at the 12 o'clock position on the cervix and the uterus sounded to 7cm.  The " Mirena  IUD was then placed in the usual fashion under sterile technique.  Strings were clipped about 2 cm from the cervical os.  Tenaculum was removed and cervix was hemostatic.  There were no complications.  The patient tolerated the procedure well.    Lot [id number]  Exp 2021    A/P:  Ms. Romelia Bhandari is a 28 year old  here for 6 week postpartum visit after VAVD. Doing well.  - Contraception: Mirena  - Feeding: breast  - Follow-up: 1 month for IUD check    Shirley Pardo MD  OB/GYN  2018 10:51 AM

## 2018-07-02 ENCOUNTER — TELEPHONE (OUTPATIENT)
Dept: OBGYN | Facility: OTHER | Age: 28
End: 2018-07-02

## 2018-07-02 DIAGNOSIS — Z97.5 IUD (INTRAUTERINE DEVICE) IN PLACE: Primary | ICD-10-CM

## 2018-07-02 NOTE — TELEPHONE ENCOUNTER
Patient calls with concern of pain with intercourse after IUD placement. She notes she did have some bleeding after placement and bleeding again after intercourse.  Patient denies continuous pain, heavy bleeding and symptoms of fever. Patient was advised that some pain and bleeding in the first weeks after insertion is not abnormal and should resolve over time. Patient is concerned that IUD is not positioned correctly and would like this addressed by Dr. Shirley Pardo MD when she returns to clinic.    Shirlene Domínguez RN...................7/2/2018 2:00 PM

## 2018-07-03 ENCOUNTER — HOSPITAL ENCOUNTER (OUTPATIENT)
Dept: ULTRASOUND IMAGING | Facility: OTHER | Age: 28
Discharge: HOME | End: 2018-07-03
Attending: OBSTETRICS & GYNECOLOGY | Admitting: OBSTETRICS & GYNECOLOGY
Payer: COMMERCIAL

## 2018-07-03 DIAGNOSIS — Z97.5 IUD (INTRAUTERINE DEVICE) IN PLACE: ICD-10-CM

## 2018-07-03 PROCEDURE — 76830 TRANSVAGINAL US NON-OB: CPT

## 2018-07-03 NOTE — TELEPHONE ENCOUNTER
Patient notified of provider recommendation. She will schedule ultrasound for as soon as available.    Shirlene Domínguez RN...................7/3/2018 11:23 AM

## 2018-07-03 NOTE — TELEPHONE ENCOUNTER
This is likely normal but we can get an ultrasound to ensure correct IUD placement. Order placed.  Shirley Pardo MD  OB/GYN  7/3/2018 8:27 AM

## 2018-07-10 LAB
FINAL DIAGNOSIS: NORMAL
HPV HR 12 DNA CVX QL NAA+PROBE: NEGATIVE
HPV16 DNA SPEC QL NAA+PROBE: NEGATIVE
HPV18 DNA SPEC QL NAA+PROBE: NEGATIVE
SPECIMEN DESCRIPTION: NORMAL
SPECIMEN SOURCE CVX/VAG CYTO: NORMAL

## 2018-07-27 ENCOUNTER — OFFICE VISIT (OUTPATIENT)
Dept: OBGYN | Facility: OTHER | Age: 28
End: 2018-07-27
Attending: OBSTETRICS & GYNECOLOGY
Payer: COMMERCIAL

## 2018-07-27 VITALS
DIASTOLIC BLOOD PRESSURE: 68 MMHG | SYSTOLIC BLOOD PRESSURE: 104 MMHG | HEART RATE: 81 BPM | WEIGHT: 124.38 LBS | BODY MASS INDEX: 21.35 KG/M2

## 2018-07-27 DIAGNOSIS — Z97.5 IUD (INTRAUTERINE DEVICE) IN PLACE: Primary | ICD-10-CM

## 2018-07-27 DIAGNOSIS — N93.9 ABNORMAL UTERINE BLEEDING (AUB): ICD-10-CM

## 2018-07-27 PROCEDURE — 99213 OFFICE O/P EST LOW 20 MIN: CPT | Performed by: OBSTETRICS & GYNECOLOGY

## 2018-07-27 RX ORDER — MEDROXYPROGESTERONE ACETATE 10 MG
10 TABLET ORAL DAILY
Qty: 10 TABLET | Refills: 0 | Status: SHIPPED | OUTPATIENT
Start: 2018-07-27 | End: 2018-08-06

## 2018-07-27 ASSESSMENT — PAIN SCALES - GENERAL: PAINLEVEL: NO PAIN (0)

## 2018-07-27 NOTE — PROGRESS NOTES
Follow-Up Visit    S: Ms. Romelia Bhandari is a 28 year old  here for an IUD check. She had a Mirena placed 18 at her postpartum visit. She reports continued spotting and brown discharge since placement. She had a slightly increased amount of bleeding this past week with more pain, wonders if it was her period. She has occasional cramping. Is getting frustrated with bleeding for so long since she had postpartum bleeding for several weeks prior to IUD placement, and had continued bleeding for the past month.  Also has some discomfort with intercourse, mostly at the vaginal opening.    O:  /68 (BP Location: Right arm, Patient Position: Sitting, Cuff Size: Adult Regular)  Pulse 81  Wt 56.4 kg (124 lb 6 oz)  Breastfeeding? Yes  BMI 21.35 kg/m2  Gen: Well-appearing, NAD  Pulm: nonlabored  Psych: appropriate mood and affect    Pelvic:  Normal appearing external female genitalia. Normal hair distribution. Vagina is without lesions. Small amount of dark blood in vaginal vault. Cervix normal, no lesions, IUD strings visible and appropriate length.    A/P:  Ms. Romelia Bhandari is a 28 year old  here for IUD check. Discussed typical irregular bleeding in the first 3 months after placement. Can do a trial of Provera to see if this helps since estrogen is relatively contraindicated with breastfeeding. Also discussed using plenty of lubricant with intercourse to help with comfort postpartum  - RTC anayeli Pardo MD  OB/GYN  2018 1:13 PM

## 2018-07-27 NOTE — MR AVS SNAPSHOT
After Visit Summary   7/27/2018    Romelia Bhandari    MRN: [id number]           Patient Information     Date Of Birth          1990        Visit Information        Provider Department      7/27/2018 1:00 PM Shirley Pardo MD [facility]        Today's Diagnoses     IUD (intrauterine device) in place    -  1    Abnormal uterine bleeding (AUB)           Follow-ups after your visit        Who to contact     If you have questions or need follow up information about today's clinic visit or your schedule please contact [facility] directly at [phone].  Normal or non-critical lab and imaging results will be communicated to you by InteliVideohart, letter or phone within 4 business days after the clinic has received the results. If you do not hear from us within 7 days, please contact the clinic through I Gotchu or phone. If you have a critical or abnormal lab result, we will notify you by phone as soon as possible.  Submit refill requests through I Gotchu or call your pharmacy and they will forward the refill request to us. Please allow 3 business days for your refill to be completed.          Additional Information About Your Visit        MyChart Information     I Gotchu gives you secure access to your electronic health record. If you see a primary care provider, you can also send messages to your care team and make appointments. If you have questions, please call your primary care clinic.  If you do not have a primary care provider, please call [phone] and they will assist you.        Care EveryWhere ID     This is your Care EveryWhere ID. This could be used by other organizations to access your [facility] medical records  [id number]        Your Vitals Were     Pulse Breastfeeding? BMI (Body Mass Index)             81 Yes 21.35 kg/m2          Blood Pressure from Last 3 Encounters:   07/27/18 104/68   06/29/18 102/64   05/20/18 119/59    Weight from Last 3  Encounters:   07/27/18 56.4 kg (124 lb 6 oz)   06/29/18 58.1 kg (128 lb)   05/18/18 64 kg (141 lb)              Today, you had the following     No orders found for display         Today's Medication Changes          These changes are accurate as of 7/27/18  1:29 PM.  If you have any questions, ask your nurse or doctor.               Start taking these medicines.        Dose/Directions    medroxyPROGESTERone 10 MG tablet   Commonly known as:  PROVERA   Used for:  Abnormal uterine bleeding (AUB)   Started by:  Shirley Pardo MD        Dose:  10 mg   Take 1 tablet (10 mg) by mouth daily for 10 days   Quantity:  10 tablet   Refills:  0            Where to get your medicines      These medications were sent to [id number] - [place], [place] - [address], [facility] [address]     Phone:  [phone]     medroxyPROGESTERone 10 MG tablet                Primary Care Provider Office Phone # Fax #    Dennise Marshall [phone]       [facility] [address] [facility] [address]        Equal Access to Services     [facility]: Hadii aad ku hadasho Soomaali, waaxda luqadaha, qaybta kaalmada adeegyada, sneha catalan ah. So [facility] [phone].    ATENCIÓN: Si habla español, tiene a herrera disposición servicios gratuitos de asistencia lingüística. [facility] [phone].    We comply with applicable federal civil rights laws and [place] laws. We do not discriminate on the basis of race, color, national origin, age, disability, sex, sexual orientation, or gender identity.            Thank you!     Thank you for choosing [facility] [place] [facility]\""  for your care. Our goal is always to provide you with excellent care. Hearing back from our patients is one way we can continue to improve our services. Please take a few minutes to complete the written survey that you may receive in the mail after your  visit with us. Thank you!             Your Updated Medication List - Protect others around you: Learn how to safely use, store and throw away your medicines at www.disposemymeds.org.          This list is accurate as of 7/27/18  1:29 PM.  Always use your most recent med list.                   Brand Name Dispense Instructions for use Diagnosis    albuterol 108 (90 Base) MCG/ACT Inhaler    PROAIR HFA/PROVENTIL HFA/VENTOLIN HFA     Inhale 2 puffs into the lungs 4 times daily as needed for wheezing        CVS PRENATAL 28-0.8 MG Tabs      Take 1 tablet by mouth daily        levonorgestrel 20 MCG/24HR IUD    MIRENA     1 each by Intrauterine route once        medroxyPROGESTERone 10 MG tablet    PROVERA    10 tablet    Take 1 tablet (10 mg) by mouth daily for 10 days    Abnormal uterine bleeding (AUB)

## 2018-07-27 NOTE — NURSING NOTE
Patient presents to clinic today for IUD string check. Patient reports bleeding and mild cramping pain.  Amalia Wells RN on 7/27/2018 at 1:06 PM

## 2018-07-27 NOTE — LETTER
2018       RE: Romelia Bhandari  [address]  [place] [address]     Dear Colleague,    Thank you for referring your patient, Romelia Bhandari, to the [facility]. Please see a copy of my visit note below.    Follow-Up Visit    S: Ms. Romelia Bhandari is a 28 year old  here for an IUD check. She had a Mirena placed 18 at her postpartum visit. She reports continued spotting and brown discharge since placement. She had a slightly increased amount of bleeding this past week with more pain, wonders if it was her period. She has occasional cramping. Is getting frustrated with bleeding for so long since she had postpartum bleeding for several weeks prior to IUD placement, and had continued bleeding for the past month.  Also has some discomfort with intercourse, mostly at the vaginal opening.    O:  /68 (BP Location: Right arm, Patient Position: Sitting, Cuff Size: Adult Regular)  Pulse 81  Wt 56.4 kg (124 lb 6 oz)  Breastfeeding? Yes  BMI 21.35 kg/m2  Gen: Well-appearing, NAD  Pulm: nonlabored  Psych: appropriate mood and affect    Pelvic:  Normal appearing external female genitalia. Normal hair distribution. Vagina is without lesions. Small amount of dark blood in vaginal vault. Cervix normal, no lesions, IUD strings visible and appropriate length.    A/P:  Ms. Romelia Bhandari is a 28 year old  here for IUD check. Discussed typical irregular bleeding in the first 3 months after placement. Can do a trial of Provera to see if this helps since estrogen is relatively contraindicated with breastfeeding. Also discussed using plenty of lubricant with intercourse to help with comfort postpartum  - RTC prjarvis Pardo MD  OB/GYN  2018 1:13 PM

## 2018-10-11 ENCOUNTER — TELEPHONE (OUTPATIENT)
Dept: OBGYN | Facility: OTHER | Age: 28
End: 2018-10-11

## 2018-10-11 NOTE — TELEPHONE ENCOUNTER
This RN telephoned pt.  No answer.  Left message on machine to call back.  Teresa Hussein RN on 10/11/2018 at 2:56 PM

## 2018-10-11 NOTE — TELEPHONE ENCOUNTER
Patient requesting a list of medications that are okay to take while breast feeding and for colds.

## 2018-10-11 NOTE — TELEPHONE ENCOUNTER
Pt returned this RN's telephone call.  Pt states she is wanting someone to review her list of medications she takes to make sure they are safe for her baby with breastfeeding.  Pt advised and instructed on the importance of notifying both her PCP and her Baby's doctor that she is breastfeeding and ask them both if it's safe for her to take her list of medications while she is breastfeeding.  Pt states her PCP is Dr. FUENTES Patel and verbalizes understanding.  Teresa Hussein RN on 10/11/2018 at 2:59 PM

## 2019-09-24 ENCOUNTER — OFFICE VISIT (OUTPATIENT)
Dept: OBGYN | Facility: OTHER | Age: 29
End: 2019-09-24
Attending: OBSTETRICS & GYNECOLOGY
Payer: COMMERCIAL

## 2019-09-24 VITALS
WEIGHT: 122.19 LBS | HEART RATE: 56 BPM | SYSTOLIC BLOOD PRESSURE: 100 MMHG | HEIGHT: 64 IN | DIASTOLIC BLOOD PRESSURE: 68 MMHG | BODY MASS INDEX: 20.86 KG/M2

## 2019-09-24 DIAGNOSIS — Z30.432 ENCOUNTER FOR IUD REMOVAL: Primary | ICD-10-CM

## 2019-09-24 PROCEDURE — 58301 REMOVE INTRAUTERINE DEVICE: CPT | Performed by: OBSTETRICS & GYNECOLOGY

## 2019-09-24 ASSESSMENT — PAIN SCALES - GENERAL: PAINLEVEL: NO PAIN (0)

## 2019-09-24 ASSESSMENT — MIFFLIN-ST. JEOR: SCORE: 1264.24

## 2019-09-24 NOTE — PROGRESS NOTES
"Follow-Up Visit    S: Ms. Romelia Bhandari is a 29 year old  here for discussion of IUD removal and pap smear. She had a LEEP 2014 with normal paps since. Last was 2018, NILM/HPV negative. She doesn't like the unpredictable bleeding with the IUD. She is open to pregnancy if it were to happen.    O:  /68 (BP Location: Right arm, Patient Position: Sitting, Cuff Size: Adult Regular)   Pulse 56   Ht 1.626 m (5' 4\")   Wt 55.4 kg (122 lb 3 oz)   LMP 2019 (Exact Date)   BMI 20.97 kg/m    Gen: Well-appearing, NAD  Pulm: nonlabored  Psych: appropriate mood and affect    Pelvic:  Normal appearing external female genitalia. Normal hair distribution. Vagina is without lesions. Small white discharge. Cervix normal, IUD strings visible    Procedure Note:  After obtaining informed consent, the IUD strings were grasped and the IUD easily removed and appeared intact. Patient tolerated procedure well.    A/P:  Ms. Romelia Bhandari is a 29 year old  here for IUD removal. Instructed to start a folic acid supplement when not using contraception. Her next pap is due in 2 years.    Shirley Pardo MD  OB/GYN  2019 3:33 PM     "

## 2019-09-24 NOTE — NURSING NOTE
"Chief Complaint   Patient presents with     Physical     pap, iud removal       Initial /68 (BP Location: Right arm, Patient Position: Sitting, Cuff Size: Adult Regular)   Pulse 56   Ht 1.626 m (5' 4\")   Wt 55.4 kg (122 lb 3 oz)   LMP 09/18/2019 (Exact Date)   BMI 20.97 kg/m   Estimated body mass index is 20.97 kg/m  as calculated from the following:    Height as of this encounter: 1.626 m (5' 4\").    Weight as of this encounter: 55.4 kg (122 lb 3 oz).  Medication Reconciliation: sina Storm LPN     Prior to the start of the procedure and with procedural staff participation, I verbally confirmed the patient s identity using two indicators, relevant allergies, that the procedure was appropriate and matched the consent or emergent situation, and that the correct equipment/implants were available. Immediately prior to starting the procedure I conducted the Time Out with the procedural staff and re-confirmed the patient s name, procedure, and site/side. (The Joint Commission universal protocol was followed.)  Yes    Sedation (Moderate or Deep): None    "

## 2019-10-25 ENCOUNTER — ALLIED HEALTH/NURSE VISIT (OUTPATIENT)
Dept: OBGYN | Facility: OTHER | Age: 29
End: 2019-10-25
Attending: OBSTETRICS & GYNECOLOGY
Payer: COMMERCIAL

## 2019-10-25 VITALS — HEIGHT: 64 IN | BODY MASS INDEX: 20.76 KG/M2 | WEIGHT: 121.6 LBS

## 2019-10-25 DIAGNOSIS — O36.80X0 ENCOUNTER TO DETERMINE FETAL VIABILITY OF PREGNANCY, SINGLE OR UNSPECIFIED FETUS: Primary | ICD-10-CM

## 2019-10-25 DIAGNOSIS — Z32.00 POSSIBLE PREGNANCY, NOT YET CONFIRMED: ICD-10-CM

## 2019-10-25 LAB — HCG UR QL: POSITIVE

## 2019-10-25 PROCEDURE — 81025 URINE PREGNANCY TEST: CPT | Mod: ZL | Performed by: OBSTETRICS & GYNECOLOGY

## 2019-10-25 PROCEDURE — 99207 ZZC OB VISIT-NO CHARGE - GICH ONLY: CPT

## 2019-10-25 RX ORDER — PRENATAL VIT/IRON FUM/FOLIC AC 27MG-0.8MG
1 TABLET ORAL DAILY
COMMUNITY
End: 2022-08-18

## 2019-10-25 ASSESSMENT — MIFFLIN-ST. JEOR: SCORE: 1261.57

## 2019-10-25 ASSESSMENT — PATIENT HEALTH QUESTIONNAIRE - PHQ9: SUM OF ALL RESPONSES TO PHQ QUESTIONS 1-9: 2

## 2019-10-25 NOTE — NURSING NOTE
HPI:    This is a 29 year old female patient,  who presents for Pregnancy confirmation visit. Patient reports positive pregnancy test at home.     Obstetrical history, OB Questionnaire, and OB Demographics updated to the best of this nurse's ability based on patient report. PHQ-9 depression screening and routine Domestic Abuse screening completed. OB Education packet provided and reviewed by this nurse. All immediate questions and concerns answered.    Last menstrual period is reported as Patient's last menstrual period was 2019.. KAUSHIK based on LMP is 20.   Her cycles are regular.  Her last menstrual period was normal.   Since her LMP, she has experienced  abdominal pain, constipation and shortness of breath.     Personal OB history includes: age of first pregnancy 27, natural births 1, G  2 and P  1  Previous OB Provider: Dr. Shirley Pardo MD   Previous Delivering Clinic: [facility]  Release of Records: n/a    Current delivery plan: [facility]  Preferred OB Provider: Dr. Shirley Pardo MD   Current Primary Care Provider: Mega Patel MD   Pediatrician: Mega Patel MD     Additional History: none    Have you travelled during the pregnancy?No  Have your sexual partner(s) travelled during the pregnancy?No      HISTORY:   Planned Pregnancy: No, but welcome  Marital Status:   Occupation:   Living in Household: Spouse and Children    Father of the baby is  involved.   Family and father of baby is supportive of current pregnancy.  Past Medical History of Father of Baby:No significant medical history    Past History:  Her past medical history   Past Medical History:   Diagnosis Date     H/O abnormal cervical Papanicolaou smear      H/O LEEP    .      She has a history of   labor at 34 weeks without delivery, term     Since her last LMP she denies use of alcohol, tobacco and street drugs.    Pap smear history: YES - updated in Problem List and Health  Maintenance accordingly    STD/STI history: No STD history    STD/STI symptoms: no noticeable symptoms     Past medical, surgical, social and family history were reviewed and updated in EPIC.    Medications reviewed by this nurse. Current medication list:  Current Outpatient Medications   Medication Sig Dispense Refill     Prenatal Vit-Fe Fumarate-FA (PRENATAL MULTIVITAMIN W/IRON) 27-0.8 MG tablet Take 1 tablet by mouth daily       albuterol (PROAIR HFA/PROVENTIL HFA/VENTOLIN HFA) 108 (90 BASE) MCG/ACT Inhaler Inhale 2 puffs into the lungs 4 times daily as needed for wheezing       The following medications were recommended to be discontinued due to Pregnancy Category D status: none  Patient informed to contact her primary care provider as soon as possible to discuss a safer alternative.    Risk factors:  Moderate and moderately severe risks (consult with OB/Gyn)  Previous fetal or  demise: No  History of  delivery: No  History of heart disease Class I: No  Severe anemia, unresponsive to iron therapy: No  Pelvic mass or neoplasm: No  Previous : No  Hyper/hypothyroidism: No  History of postpartum hemorrhage requiring transfusion:No  History of Placenta Accreta: No    High Risk (Pregnancy managed by OB/Gyn)  Multiple pregnancy: No  Pre-gestational diabetes: No  Chronic Hypertension: No  Renal Failure: No  Heart disease, class II or greater: No  Rh Isoimmunization: No  Chronic active hepatitis: No  Convulsive disorder, poorly controlled: No  Isoimmune thrombocytopenia: No  Pre-term premature rupture of membranes: No  Lupus or other autoimmune disorder: No  Human Immunodeficiency Virus: No      ASSESSMENT/PLAN:       ICD-10-CM    1. Encounter to determine fetal viability of pregnancy, single or unspecified fetus O36.80X0 US OB < 14 Weeks Single   2. Possible pregnancy, not yet confirmed Z32.00 HCG qualitative urine       29 year old , 5w2d of pregnancy with KAUSHIK of 2020, by Last  Menstrual Period    Urine pregnancy test completed during this visit, results were as noted above.     Per standing orders and scope of practice of this nurse, patient will have the following orders placed and completed prior to initial OB visit with the appropriate provider:    --early ultrasound for dating and viability ordered for approximately 6-7 weeks gestation based on LMP    --Quantitative Beta HCG and progesterone monitoring if indicated    Counseling given:     - Recommended weight gain for pregnancy: 25-35 lbs.   BMI < 18.5  28-40 lbs   18.5 - 24.9 25-35   25 - 29.9 15-25   > 30  11-20      PLAN/PATIENT INSTRUCTIONS:    Follow up in 3 - 5 weeks.  Normal exercise.  Normal sexual activity.  Prenatal vitamins.  Anticipated weight gain 25-35 lb.    follow-up appointment with Dr. Shirley Pardo MD  for pre-jennifer care, take multivitamin or pre-jennifer vitamins and OB Education packet given    Shirlene Domínguez RN.................................................. 10/25/2019 8:46 AM

## 2019-10-25 NOTE — PATIENT INSTRUCTIONS
Follow up in 3 - 5 weeks.  Normal exercise.  Normal sexual activity.  Prenatal vitamins.  Anticipated weight gain 25-35 lb.    follow-up appointment with Dr. Shirley Pardo MD  for pre- care, take multivitamin or pre- vitamins and OB Education packet given

## 2019-11-22 ENCOUNTER — PRENATAL OFFICE VISIT (OUTPATIENT)
Dept: OBGYN | Facility: OTHER | Age: 29
End: 2019-11-22
Attending: OBSTETRICS & GYNECOLOGY
Payer: COMMERCIAL

## 2019-11-22 ENCOUNTER — HOSPITAL ENCOUNTER (OUTPATIENT)
Dept: ULTRASOUND IMAGING | Facility: OTHER | Age: 29
Discharge: HOME | End: 2019-11-22
Attending: OBSTETRICS & GYNECOLOGY | Admitting: OBSTETRICS & GYNECOLOGY
Payer: COMMERCIAL

## 2019-11-22 VITALS
DIASTOLIC BLOOD PRESSURE: 56 MMHG | HEART RATE: 60 BPM | WEIGHT: 122 LBS | HEIGHT: 64 IN | SYSTOLIC BLOOD PRESSURE: 98 MMHG | BODY MASS INDEX: 20.83 KG/M2

## 2019-11-22 DIAGNOSIS — O09.891 SUPERVISION OF OTHER HIGH RISK PREGNANCIES, FIRST TRIMESTER: Primary | ICD-10-CM

## 2019-11-22 DIAGNOSIS — Z87.59 HISTORY OF PRIOR PREGNANCY WITH IUGR NEWBORN: ICD-10-CM

## 2019-11-22 DIAGNOSIS — O36.80X0 ENCOUNTER TO DETERMINE FETAL VIABILITY OF PREGNANCY, SINGLE OR UNSPECIFIED FETUS: ICD-10-CM

## 2019-11-22 LAB
ABO + RH BLD: NORMAL
ABO + RH BLD: NORMAL
BLD GP AB SCN SERPL QL: NORMAL
BLOOD BANK CMNT PATIENT-IMP: NORMAL
C TRACH DNA SPEC QL PROBE+SIG AMP: NOT DETECTED
ERYTHROCYTE [DISTWIDTH] IN BLOOD BY AUTOMATED COUNT: 11.6 % (ref 10–15)
HCT VFR BLD AUTO: 36.9 % (ref 35–47)
HGB BLD-MCNC: 12.3 G/DL (ref 11.7–15.7)
MCH RBC QN AUTO: 29.5 PG (ref 26.5–33)
MCHC RBC AUTO-ENTMCNC: 33.3 G/DL (ref 31.5–36.5)
MCV RBC AUTO: 89 FL (ref 78–100)
N GONORRHOEA DNA SPEC QL PROBE+SIG AMP: NOT DETECTED
PLATELET # BLD AUTO: 228 10E9/L (ref 150–450)
RBC # BLD AUTO: 4.17 10E12/L (ref 3.8–5.2)
SPECIMEN EXP DATE BLD: NORMAL
SPECIMEN SOURCE: NORMAL
WBC # BLD AUTO: 9.3 10E9/L (ref 4–11)

## 2019-11-22 PROCEDURE — 85027 COMPLETE CBC AUTOMATED: CPT | Mod: ZL | Performed by: OBSTETRICS & GYNECOLOGY

## 2019-11-22 PROCEDURE — 36415 COLL VENOUS BLD VENIPUNCTURE: CPT | Mod: ZL | Performed by: OBSTETRICS & GYNECOLOGY

## 2019-11-22 PROCEDURE — 86762 RUBELLA ANTIBODY: CPT | Mod: ZL | Performed by: OBSTETRICS & GYNECOLOGY

## 2019-11-22 PROCEDURE — 86592 SYPHILIS TEST NON-TREP QUAL: CPT | Mod: ZL | Performed by: OBSTETRICS & GYNECOLOGY

## 2019-11-22 PROCEDURE — 99207 ZZC OB VISIT-NO CHARGE - GICH ONLY: CPT | Performed by: OBSTETRICS & GYNECOLOGY

## 2019-11-22 PROCEDURE — 76801 OB US < 14 WKS SINGLE FETUS: CPT

## 2019-11-22 PROCEDURE — 87491 CHLMYD TRACH DNA AMP PROBE: CPT | Mod: ZL | Performed by: OBSTETRICS & GYNECOLOGY

## 2019-11-22 PROCEDURE — 87389 HIV-1 AG W/HIV-1&-2 AB AG IA: CPT | Mod: ZL | Performed by: OBSTETRICS & GYNECOLOGY

## 2019-11-22 PROCEDURE — 86780 TREPONEMA PALLIDUM: CPT | Mod: ZL | Performed by: OBSTETRICS & GYNECOLOGY

## 2019-11-22 PROCEDURE — 86901 BLOOD TYPING SEROLOGIC RH(D): CPT | Mod: ZL | Performed by: OBSTETRICS & GYNECOLOGY

## 2019-11-22 PROCEDURE — 86900 BLOOD TYPING SEROLOGIC ABO: CPT | Mod: ZL | Performed by: OBSTETRICS & GYNECOLOGY

## 2019-11-22 PROCEDURE — 87340 HEPATITIS B SURFACE AG IA: CPT | Mod: ZL | Performed by: OBSTETRICS & GYNECOLOGY

## 2019-11-22 PROCEDURE — 87086 URINE CULTURE/COLONY COUNT: CPT | Mod: ZL | Performed by: OBSTETRICS & GYNECOLOGY

## 2019-11-22 PROCEDURE — 86850 RBC ANTIBODY SCREEN: CPT | Mod: ZL | Performed by: OBSTETRICS & GYNECOLOGY

## 2019-11-22 PROCEDURE — 87591 N.GONORRHOEAE DNA AMP PROB: CPT | Mod: ZL | Performed by: OBSTETRICS & GYNECOLOGY

## 2019-11-22 ASSESSMENT — MIFFLIN-ST. JEOR: SCORE: 1263.39

## 2019-11-22 ASSESSMENT — PAIN SCALES - GENERAL: PAINLEVEL: NO PAIN (0)

## 2019-11-22 NOTE — PROGRESS NOTES
"New Obstetrics Visit    HPI: 29 year old  at 9w1d by LMP c/w 9w1d US here today for initial OB visit. Her LMP was 19. This was a planned pregnancy. She has been nauseated, vomiting a few times per week. No cramping. Had minimal brown spotting a few weeks ago, none since.     OBHx  OB History    Para Term  AB Living   2 1 1 0 0 1   SAB TAB Ectopic Multiple Live Births   0 0 0 0 1      # Outcome Date GA Lbr Dewey/2nd Weight Sex Delivery Anes PTL Lv   2 Current            1 Term 18 38w0d 06:45 / 00:38 2.761 kg (6 lb 1.4 oz) F Vag-Vacuum EPI N JONH      Birth Comments: head molding and bruising      Complications: Fetal Intolerance      Name: ELIAS KATZ      Apgar1: 7  Apgar5: 8         PMHx:   Past Medical History:   Diagnosis Date     H/O abnormal cervical Papanicolaou smear      H/O LEEP       PSHx:   Past Surgical History:   Procedure Laterality Date     EXTRACTION(S) DENTAL      age 16,no anesthetic problems      Meds:   Current Outpatient Medications   Medication     albuterol (PROAIR HFA/PROVENTIL HFA/VENTOLIN HFA) 108 (90 BASE) MCG/ACT Inhaler     Prenatal Vit-Fe Fumarate-FA (PRENATAL MULTIVITAMIN W/IRON) 27-0.8 MG tablet     No current facility-administered medications for this visit.      Allergies:     Allergies   Allergen Reactions     Penicillins Rash       SocHx:   Social History     Tobacco Use     Smoking status: Never Smoker     Smokeless tobacco: Never Used   Substance Use Topics     Alcohol use: No     Drug use: No     Comment: Drug use: No     Lives with her  and their daughter. Works at [employer].    FamHx: negative     ROS: 10-Point ROS negative except as noted in HPI      Physical Exam  BP 98/56 (BP Location: Right arm, Patient Position: Sitting, Cuff Size: Adult Regular)   Pulse 60   Ht 1.626 m (5' 4\")   Wt 55.3 kg (122 lb)   LMP 2019   Breastfeeding No   BMI 20.94 kg/m    Body mass index is 20.94 kg/m .  Gen: Well-appearing, NAD  HEENT: " Normocephalic, atraumatic  Neck: Thyroid is not enlarged, no appreciable masses palpated. Non-tender  CV:  RRR, no m/r/g auscultated  Pulm: CTAB, no w/r/r auscultated  Abd: Soft, non-tender, non-distended  Ext: No LE edema, extremities warm and well perfused    Breast: Symmetric, no nipple discharge or retraction, no axillary lymphadenopathy, no palpable nodules or masses bilaterally    Pelvic:  Normal appearing external female genitalia. No vaginal lesions. Minimal white discharge. Cervix s/p LEEP but no lesions. Uterus is 9 wk size, mobile, non-tender, midposition. No adnexal tenderness or masses      Assessment/Plan:  Ms. Romelia Bhandari is a 29 year old  at 9w1d by LMP c/w 9w1d US, here for new OB visit. Pregnancy is complicated by history of IUGR, hx of LEEP.  1. Hx of IUGR: Plan serial growth US in 3rd trimester, weekly NSTs starting at 36 weeks  2. New OB labs ord'd  3. Genetics: declines  4. Imaging: dating US 2019  5. Immunizations: s/p flu    Follow up in 4 weeks.    Shirley Pardo MD  OB/GYN  2019 3:51 PM

## 2019-11-22 NOTE — NURSING NOTE
Chief Complaint   Patient presents with     Prenatal Care     OBPX -      Still having nausea and vomiting   Marilyn Clemons LPN........................11/22/2019  3:42 PM   Medication Reconciliation: completed   Marilyn Clemons LPN  11/22/2019 3:42 PM

## 2019-11-25 LAB
BACTERIA SPEC CULT: NO GROWTH
SPECIMEN SOURCE: NORMAL

## 2019-11-26 LAB
HBV SURFACE AG SERPL QL IA: NONREACTIVE
HIV 1+2 AB+HIV1 P24 AG SERPL QL IA: NONREACTIVE
RPR SER QL: NONREACTIVE
RUBV IGG SERPL IA-ACNC: 84 IU/ML
T PALLIDUM AB SER QL: ABNORMAL

## 2019-11-30 LAB
RPR SER QL: NONREACTIVE
T PALLIDUM AB SER QL AGGL: NORMAL

## 2019-12-20 ENCOUNTER — PRENATAL OFFICE VISIT (OUTPATIENT)
Dept: OBGYN | Facility: OTHER | Age: 29
End: 2019-12-20
Attending: OBSTETRICS & GYNECOLOGY
Payer: COMMERCIAL

## 2019-12-20 VITALS
SYSTOLIC BLOOD PRESSURE: 108 MMHG | WEIGHT: 119 LBS | BODY MASS INDEX: 20.43 KG/M2 | HEART RATE: 72 BPM | DIASTOLIC BLOOD PRESSURE: 60 MMHG

## 2019-12-20 DIAGNOSIS — O09.891 SUPERVISION OF OTHER HIGH RISK PREGNANCIES, FIRST TRIMESTER: Primary | ICD-10-CM

## 2019-12-20 DIAGNOSIS — Z87.59 HISTORY OF PRIOR PREGNANCY WITH IUGR NEWBORN: ICD-10-CM

## 2019-12-20 PROCEDURE — 99207 ZZC OB VISIT-NO CHARGE - GICH ONLY: CPT | Performed by: OBSTETRICS & GYNECOLOGY

## 2019-12-20 ASSESSMENT — PAIN SCALES - GENERAL: PAINLEVEL: NO PAIN (0)

## 2019-12-20 NOTE — NURSING NOTE
Chief Complaint   Patient presents with     Prenatal Care     13W1D        Medication Reconciliation: completed   Marilyn Clemons LPN  12/20/2019 1:05 PM

## 2019-12-20 NOTE — PROGRESS NOTES
Return OB Visit    S: Patient is feeling better. Nausea improved. No cramping or VB.     O: LMP 2019 (Exact Date)   Gen: Well-appearing, NAD  See OB Flowsheet    A/P:  Romelia Bhandari is a 29 year old  at 13w1d by LMP c/w 9w1d US, here for return OB visit.  Hx of IUGR: needs serial growth US, weekly NSTs starting at 36 wks  Hx of LEEP    PNC: Rh positive, Rubella immune  Genetics: declines  Imaging: dating US 9w1d  Immunizations: s/p flu  RTC 4 weeks    Shirley Pardo MD  OB/GYN  2019 12:53 PM

## 2020-01-24 ENCOUNTER — PRENATAL OFFICE VISIT (OUTPATIENT)
Dept: OBGYN | Facility: OTHER | Age: 30
End: 2020-01-24
Attending: OBSTETRICS & GYNECOLOGY
Payer: COMMERCIAL

## 2020-01-24 VITALS
SYSTOLIC BLOOD PRESSURE: 106 MMHG | HEART RATE: 84 BPM | OXYGEN SATURATION: 99 % | BODY MASS INDEX: 21.08 KG/M2 | DIASTOLIC BLOOD PRESSURE: 64 MMHG | WEIGHT: 122.8 LBS

## 2020-01-24 DIAGNOSIS — Z87.59 HISTORY OF PRIOR PREGNANCY WITH IUGR NEWBORN: Primary | ICD-10-CM

## 2020-01-24 PROCEDURE — 99207 ZZC OB VISIT-NO CHARGE - GICH ONLY: CPT | Performed by: OBSTETRICS & GYNECOLOGY

## 2020-01-24 ASSESSMENT — PAIN SCALES - GENERAL: PAINLEVEL: NO PAIN (0)

## 2020-01-24 NOTE — PROGRESS NOTES
Return OB Visit    S: Patient is feeling better. Nausea resolved. Energy improving. Occasional cramping. No VB. +FM    O: /64   Pulse 84   Wt 55.7 kg (122 lb 12.8 oz)   LMP 2019 (Exact Date)   SpO2 99%   Breastfeeding No   BMI 21.08 kg/m    Gen: Well-appearing, NAD  See OB Flowsheet    A/P:  Romelia Bhandari is a 30 year old  at 18w1d by LMP c/w 9w1d US, here for return OB visit.  Hx of IUGR: needs serial growth US.  Hx of LEEP     PNC: Rh positive, Rubella immune  Genetics: declines  Imaging: dating US 9w1d, anatomy survey ord'd  Immunizations: s/p flu  RTC 4 weeks    Shirley Pardo MD  OB/GYN  2020 8:50 AM

## 2020-01-24 NOTE — NURSING NOTE
"Chief Complaint   Patient presents with     Prenatal Care     18w1d       Initial /64   Pulse 84   Wt 55.7 kg (122 lb 12.8 oz)   LMP 09/19/2019 (Exact Date)   SpO2 99%   Breastfeeding No   BMI 21.08 kg/m   Estimated body mass index is 21.08 kg/m  as calculated from the following:    Height as of 11/22/19: 1.626 m (5' 4\").    Weight as of this encounter: 55.7 kg (122 lb 12.8 oz).  Medication Reconciliation: complete    Mirta Quezada, TITI  "

## 2020-02-04 ENCOUNTER — HOSPITAL ENCOUNTER (OUTPATIENT)
Dept: ULTRASOUND IMAGING | Facility: OTHER | Age: 30
Discharge: HOME | End: 2020-02-04
Attending: OBSTETRICS & GYNECOLOGY | Admitting: OBSTETRICS & GYNECOLOGY
Payer: COMMERCIAL

## 2020-02-04 DIAGNOSIS — Z87.59 HISTORY OF PRIOR PREGNANCY WITH IUGR NEWBORN: ICD-10-CM

## 2020-02-04 PROCEDURE — 76805 OB US >/= 14 WKS SNGL FETUS: CPT

## 2020-02-21 ENCOUNTER — PRENATAL OFFICE VISIT (OUTPATIENT)
Dept: OBGYN | Facility: OTHER | Age: 30
End: 2020-02-21
Attending: OBSTETRICS & GYNECOLOGY
Payer: COMMERCIAL

## 2020-02-21 VITALS
WEIGHT: 128 LBS | SYSTOLIC BLOOD PRESSURE: 98 MMHG | DIASTOLIC BLOOD PRESSURE: 56 MMHG | HEART RATE: 72 BPM | BODY MASS INDEX: 21.97 KG/M2

## 2020-02-21 DIAGNOSIS — Z87.59 HISTORY OF PRIOR PREGNANCY WITH IUGR NEWBORN: Primary | ICD-10-CM

## 2020-02-21 PROCEDURE — 99207 ZZC OB VISIT-NO CHARGE - GICH ONLY: CPT | Performed by: OBSTETRICS & GYNECOLOGY

## 2020-02-21 ASSESSMENT — PAIN SCALES - GENERAL: PAINLEVEL: NO PAIN (0)

## 2020-02-21 NOTE — NURSING NOTE
Chief Complaint   Patient presents with     Prenatal Care     22w1D     Patient states she has had some contractions every 15 min lasting at least an hour and after drinking some water she did feel better.     Marilyn Clemons LPN........................2/21/2020  3:25 PM     Medication Reconciliation: completed   Marilyn Clemons LPN  2/21/2020 3:25 PM

## 2020-02-21 NOTE — PROGRESS NOTES
Return OB Visit    S: Patient is feeling well. Had some ctx last week that resolved with rest and fluids. No VB or LOF. +FM    O: BP 98/56 (BP Location: Right arm, Patient Position: Sitting, Cuff Size: Adult Regular)   Pulse 72   Wt 58.1 kg (128 lb)   LMP 2019 (Exact Date)   Breastfeeding No   BMI 21.97 kg/m    Gen: Well-appearing, NAD  See OB Flowsheet    A/P:  Romelia Bhandari is a 30 year old  at 22w1d by LMP c/w 9w1d US, here for return OB visit.  Hx of IUGR: growth US at 32 weeks  Hx of LEEP     PNC: Rh positive, Rubella immune  Genetics: declines  Imaging: dating US 9w1d, anatomy survey normal except incomplete views- repeat ord'd  Immunizations: s/p flu  RTC 4 weeks    Shirley Pardo MD  OB/GYN  2020 3:38 PM

## 2020-02-26 ENCOUNTER — HOSPITAL ENCOUNTER (OUTPATIENT)
Dept: ULTRASOUND IMAGING | Facility: OTHER | Age: 30
Discharge: HOME | End: 2020-02-26
Attending: OBSTETRICS & GYNECOLOGY | Admitting: OBSTETRICS & GYNECOLOGY
Payer: COMMERCIAL

## 2020-02-26 DIAGNOSIS — Z87.59 HISTORY OF PRIOR PREGNANCY WITH IUGR NEWBORN: ICD-10-CM

## 2020-02-26 PROCEDURE — 76816 OB US FOLLOW-UP PER FETUS: CPT

## 2020-03-11 ENCOUNTER — HEALTH MAINTENANCE LETTER (OUTPATIENT)
Age: 30
End: 2020-03-11

## 2020-03-20 ENCOUNTER — PRENATAL OFFICE VISIT (OUTPATIENT)
Dept: OBGYN | Facility: OTHER | Age: 30
End: 2020-03-20
Attending: OBSTETRICS & GYNECOLOGY
Payer: COMMERCIAL

## 2020-03-20 VITALS
WEIGHT: 131 LBS | DIASTOLIC BLOOD PRESSURE: 50 MMHG | SYSTOLIC BLOOD PRESSURE: 88 MMHG | BODY MASS INDEX: 22.49 KG/M2 | HEART RATE: 60 BPM

## 2020-03-20 DIAGNOSIS — Z3A.26 26 WEEKS GESTATION OF PREGNANCY: ICD-10-CM

## 2020-03-20 DIAGNOSIS — O09.92 SUPERVISION OF HIGH RISK PREGNANCY IN SECOND TRIMESTER: Primary | ICD-10-CM

## 2020-03-20 LAB
ERYTHROCYTE [DISTWIDTH] IN BLOOD BY AUTOMATED COUNT: 13 % (ref 10–15)
GLUCOSE 1H P 50 G GLC PO SERPL-MCNC: 131 MG/DL (ref 60–129)
HCT VFR BLD AUTO: 35 % (ref 35–47)
HGB BLD-MCNC: 11.5 G/DL (ref 11.7–15.7)
MCH RBC QN AUTO: 30.8 PG (ref 26.5–33)
MCHC RBC AUTO-ENTMCNC: 32.9 G/DL (ref 31.5–36.5)
MCV RBC AUTO: 94 FL (ref 78–100)
PLATELET # BLD AUTO: 200 10E9/L (ref 150–450)
RBC # BLD AUTO: 3.73 10E12/L (ref 3.8–5.2)
WBC # BLD AUTO: 7.6 10E9/L (ref 4–11)

## 2020-03-20 PROCEDURE — 36415 COLL VENOUS BLD VENIPUNCTURE: CPT | Mod: ZL | Performed by: OBSTETRICS & GYNECOLOGY

## 2020-03-20 PROCEDURE — 99207 ZZC OB VISIT-NO CHARGE - GICH ONLY: CPT | Performed by: OBSTETRICS & GYNECOLOGY

## 2020-03-20 PROCEDURE — 85027 COMPLETE CBC AUTOMATED: CPT | Mod: ZL | Performed by: OBSTETRICS & GYNECOLOGY

## 2020-03-20 PROCEDURE — 82950 GLUCOSE TEST: CPT | Mod: ZL | Performed by: OBSTETRICS & GYNECOLOGY

## 2020-03-20 PROCEDURE — 86780 TREPONEMA PALLIDUM: CPT | Mod: ZL | Performed by: OBSTETRICS & GYNECOLOGY

## 2020-03-20 PROCEDURE — 90471 IMMUNIZATION ADMIN: CPT | Performed by: OBSTETRICS & GYNECOLOGY

## 2020-03-20 PROCEDURE — 90715 TDAP VACCINE 7 YRS/> IM: CPT | Performed by: OBSTETRICS & GYNECOLOGY

## 2020-03-20 ASSESSMENT — PAIN SCALES - GENERAL: PAINLEVEL: NO PAIN (0)

## 2020-03-20 NOTE — PROGRESS NOTES
Return OB Visit    S: Patient is feeling well. No ctx, VB or LOF. +FM    O: BP (!) 88/50 (BP Location: Right arm, Patient Position: Sitting, Cuff Size: Adult Regular)   Pulse 60   Wt 59.4 kg (131 lb)   LMP 2019 (Exact Date)   Breastfeeding No   BMI 22.49 kg/m    Gen: Well-appearing, NAD  See OB Flowsheet    A/P:  Romelia Bhandari is a 30 year old  at 26w1d by LMP c/w 9w1d US, here for return OB visit.  Hx of IUGR: growth US at 32 weeks  Hx of LEEP     PNC: Rh positive, Rubella immune  Genetics: declines  Imaging: dating US 9w1d, anatomy survey normal after follow up  Immunizations: s/p flu, Tdap  RTC 4 weeks    Shirley Pardo MD  OB/GYN  3/20/2020 4:35 PM

## 2020-03-20 NOTE — NURSING NOTE
Chief Complaint   Patient presents with     Prenatal Care     26w1D       Medication Reconciliation: completed   Marilyn Clemons LPN  3/20/2020 2:52 PM

## 2020-03-24 LAB — T PALLIDUM AB SER QL: NONREACTIVE

## 2020-04-13 ENCOUNTER — MYC MEDICAL ADVICE (OUTPATIENT)
Dept: OBGYN | Facility: OTHER | Age: 30
End: 2020-04-13

## 2020-04-17 ENCOUNTER — PRENATAL OFFICE VISIT (OUTPATIENT)
Dept: OBGYN | Facility: OTHER | Age: 30
End: 2020-04-17
Attending: OBSTETRICS & GYNECOLOGY
Payer: COMMERCIAL

## 2020-04-17 VITALS
DIASTOLIC BLOOD PRESSURE: 56 MMHG | WEIGHT: 135 LBS | BODY MASS INDEX: 23.17 KG/M2 | SYSTOLIC BLOOD PRESSURE: 92 MMHG | HEART RATE: 64 BPM

## 2020-04-17 DIAGNOSIS — O09.93 SUPERVISION OF HIGH RISK PREGNANCY IN THIRD TRIMESTER: Primary | ICD-10-CM

## 2020-04-17 PROCEDURE — 99207 ZZC OB VISIT-NO CHARGE - GICH ONLY: CPT | Performed by: OBSTETRICS & GYNECOLOGY

## 2020-04-17 ASSESSMENT — PAIN SCALES - GENERAL: PAINLEVEL: NO PAIN (0)

## 2020-04-17 NOTE — NURSING NOTE
Chief Complaint   Patient presents with     Prenatal Care     30W1D        Medication Reconciliation: completed   Marilyn Clemons LPN  4/17/2020 2:53 PM

## 2020-04-17 NOTE — PROGRESS NOTES
Return OB Visit    S: Patient is feeling well. Had some cramping about 5 days ago, associated with mucous discharge. Lasted about an hour and resolved. None since. No VB or LOF. +FM    O: BP 92/56 (BP Location: Right arm, Patient Position: Sitting, Cuff Size: Adult Regular)   Pulse 64   Wt 61.2 kg (135 lb)   LMP 2019 (Exact Date)   Breastfeeding No   BMI 23.17 kg/m    Gen: Well-appearing, NAD  See OB Flowsheet    A/P:  Romelia Bhandari is a 30 year old  at 30w1d by LMP c/w 9w1d US, here for return OB visit.  Hx of IUGR: growth US at 32 weeks  Hx of LEEP  Plans breastfeeding, epidural, Mirena     PNC: Rh positive, Rubella immune  Genetics: declines  Imaging: dating US 9w1d, anatomy survey normal after follow up  Immunizations: s/p flu, Tdap  RTC 4 weeks    Shirley Pardo MD  OB/GYN  2020 3:13 PM

## 2020-05-13 ENCOUNTER — HOSPITAL ENCOUNTER (OUTPATIENT)
Dept: ULTRASOUND IMAGING | Facility: OTHER | Age: 30
End: 2020-05-13
Attending: OBSTETRICS & GYNECOLOGY
Payer: COMMERCIAL

## 2020-05-13 ENCOUNTER — PRENATAL OFFICE VISIT (OUTPATIENT)
Dept: OBGYN | Facility: OTHER | Age: 30
End: 2020-05-13
Attending: OBSTETRICS & GYNECOLOGY
Payer: COMMERCIAL

## 2020-05-13 VITALS
DIASTOLIC BLOOD PRESSURE: 70 MMHG | SYSTOLIC BLOOD PRESSURE: 106 MMHG | WEIGHT: 139.3 LBS | BODY MASS INDEX: 23.91 KG/M2 | HEART RATE: 97 BPM

## 2020-05-13 DIAGNOSIS — O09.93 SUPERVISION OF HIGH RISK PREGNANCY IN THIRD TRIMESTER: Primary | ICD-10-CM

## 2020-05-13 DIAGNOSIS — O09.92 SUPERVISION OF HIGH RISK PREGNANCY IN SECOND TRIMESTER: ICD-10-CM

## 2020-05-13 PROCEDURE — 99207 ZZC OB VISIT-NO CHARGE - GICH ONLY: CPT | Performed by: OBSTETRICS & GYNECOLOGY

## 2020-05-13 PROCEDURE — 76816 OB US FOLLOW-UP PER FETUS: CPT

## 2020-05-13 ASSESSMENT — PAIN SCALES - GENERAL: PAINLEVEL: MILD PAIN (2)

## 2020-05-13 NOTE — PROGRESS NOTES
Return OB Visit    S: Patient is feeling well. Still working. Some BH ctx. No VB or LOF. +FM    O: /70 (BP Location: Right arm, Patient Position: Sitting, Cuff Size: Adult Regular)   Pulse 97   Wt 63.2 kg (139 lb 4.8 oz)   LMP 2019 (Exact Date)   Breastfeeding No   BMI 23.91 kg/m    Gen: Well-appearing, NAD  See OB Flowsheet    A/P:  Romelia Bhandari is a 30 year old  at 33w6d by LMP c/w 9w1d US, here for return OB visit.  Hx of IUGR: growth US 2020 1934g (36%ile)  Hx of LEEP  Plans breastfeeding, epidural, Mirena     PNC: Rh positive, Rubella immune  Genetics: declines  Imaging: dating US 9w1d, anatomy survey normal after follow up  Immunizations: s/p flu, Tdap  RTC 2 weeks- GBS next visit    Shirley Pardo MD  OB/GYN  2020 3:52 PM

## 2020-05-13 NOTE — NURSING NOTE
Chief Complaint   Patient presents with     Prenatal Care     33w 6d      Doing well. Has been feeling some contractions at night. No pattern. Noticing that she is feeling like her BP is elevated. Getting dizzy easily especially when bending over. PEDRITO was slighted elevated for patient. Lots of fetal movement.     Medication Reconciliation: complete    Brenda Landrum, LPN

## 2020-05-29 ENCOUNTER — PRENATAL OFFICE VISIT (OUTPATIENT)
Dept: OBGYN | Facility: OTHER | Age: 30
End: 2020-05-29
Attending: OBSTETRICS & GYNECOLOGY
Payer: COMMERCIAL

## 2020-05-29 VITALS
WEIGHT: 140 LBS | BODY MASS INDEX: 24.03 KG/M2 | DIASTOLIC BLOOD PRESSURE: 60 MMHG | HEART RATE: 66 BPM | SYSTOLIC BLOOD PRESSURE: 104 MMHG

## 2020-05-29 DIAGNOSIS — O09.93 SUPERVISION OF HIGH RISK PREGNANCY IN THIRD TRIMESTER: Primary | ICD-10-CM

## 2020-05-29 PROCEDURE — 99207 ZZC OB VISIT-NO CHARGE - GICH ONLY: CPT | Performed by: OBSTETRICS & GYNECOLOGY

## 2020-05-29 PROCEDURE — 87081 CULTURE SCREEN ONLY: CPT | Mod: ZL | Performed by: OBSTETRICS & GYNECOLOGY

## 2020-05-29 ASSESSMENT — PAIN SCALES - GENERAL: PAINLEVEL: MILD PAIN (2)

## 2020-05-29 NOTE — NURSING NOTE
Chief Complaint   Patient presents with     Prenatal Care     36w1d       Medication Reconciliation: completed   Marilyn Clemons LPN  5/29/2020 3:35 PM

## 2020-05-29 NOTE — LETTER
[facility]  [address]  [place] [id number] [phone]          May 29, 2020    RE:  Romelia Bhandari                                                                                                                                                       [address]   [place] [address]            To whom it may concern:    Romelia Bhandari is under my professional care for her pregnancy. Due to risk of exposure to COVID-19 at term, she is not able to work after 6/4/20 unless she is able to work remotely from home. Please call my office with any questions.    Sincerely,        Shirley Pardo MD

## 2020-05-29 NOTE — PROGRESS NOTES
Return OB Visit    S: Patient is feeling well. More ctx lately. No VB or LOF. +FM    O: /60 (BP Location: Right arm, Patient Position: Sitting, Cuff Size: Adult Regular)   Pulse 66   Wt 63.5 kg (140 lb)   LMP 2019 (Exact Date)   Breastfeeding No   BMI 24.03 kg/m    Gen: Well-appearing, NAD  See OB Flowsheet    A/P:  Romelia Bhandari is a 30 year old  at 36w1d by LMP c/w 9w1d US, here for return OB visit.  Hx of IUGR: growth US 2020 1934g (36%ile)  Hx of LEEP  Plans breastfeeding, epidural, Mirena     PNC: Rh positive, Rubella immune  Genetics: declines  Imaging: dating US 9w1d, anatomy survey normal after follow up  Immunizations: s/p flu, Tdap  RTC weekly until delivery    Shirley Pardo MD  OB/GYN  2020 4:17 PM

## 2020-05-31 LAB
BACTERIA SPEC CULT: NORMAL
SPECIMEN SOURCE: NORMAL

## 2020-06-02 ENCOUNTER — PRENATAL OFFICE VISIT (OUTPATIENT)
Dept: OBGYN | Facility: OTHER | Age: 30
End: 2020-06-02
Attending: OBSTETRICS & GYNECOLOGY
Payer: COMMERCIAL

## 2020-06-02 VITALS
DIASTOLIC BLOOD PRESSURE: 60 MMHG | HEART RATE: 68 BPM | SYSTOLIC BLOOD PRESSURE: 110 MMHG | WEIGHT: 139 LBS | BODY MASS INDEX: 23.86 KG/M2

## 2020-06-02 DIAGNOSIS — O09.93 SUPERVISION OF HIGH RISK PREGNANCY IN THIRD TRIMESTER: Primary | ICD-10-CM

## 2020-06-02 DIAGNOSIS — R30.0 DYSURIA: ICD-10-CM

## 2020-06-02 LAB
ALBUMIN UR-MCNC: NEGATIVE MG/DL
APPEARANCE UR: CLEAR
BACTERIA #/AREA URNS HPF: ABNORMAL /HPF
BILIRUB UR QL STRIP: NEGATIVE
COLOR UR AUTO: YELLOW
GLUCOSE UR STRIP-MCNC: NEGATIVE MG/DL
HGB UR QL STRIP: NEGATIVE
KETONES UR STRIP-MCNC: 40 MG/DL
LEUKOCYTE ESTERASE UR QL STRIP: ABNORMAL
MUCOUS THREADS #/AREA URNS LPF: PRESENT /LPF
NITRATE UR QL: NEGATIVE
PH UR STRIP: 6.5 PH (ref 5–7)
RBC #/AREA URNS AUTO: 1 /HPF (ref 0–2)
SOURCE: ABNORMAL
SP GR UR STRIP: 1.01 (ref 1–1.03)
UROBILINOGEN UR STRIP-MCNC: NORMAL MG/DL (ref 0–2)
WBC #/AREA URNS AUTO: <1 /HPF (ref 0–5)

## 2020-06-02 PROCEDURE — 81001 URINALYSIS AUTO W/SCOPE: CPT | Mod: ZL | Performed by: OBSTETRICS & GYNECOLOGY

## 2020-06-02 PROCEDURE — 99207 ZZC OB VISIT-NO CHARGE - GICH ONLY: CPT | Performed by: OBSTETRICS & GYNECOLOGY

## 2020-06-02 ASSESSMENT — PAIN SCALES - GENERAL: PAINLEVEL: NO PAIN (0)

## 2020-06-02 NOTE — PROGRESS NOTES
Return OB Visit    S: Patient reports increased contractions over the weekend. No VB or LOF. +FM. Also reports some burning after urination.    O: /60 (BP Location: Right arm, Patient Position: Sitting, Cuff Size: Adult Regular)   Pulse 68   Wt 63 kg (139 lb)   LMP 2019 (Exact Date)   Breastfeeding No   BMI 23.86 kg/m    Gen: Well-appearing, NAD  See OB Flowsheet    A/P:  Romelia Bhandari is a 30 year old  at 36w5d by LMP c/w 9w1d US, here for return OB visit.  Hx of IUGR: growth US 2020 1934g (36%ile)  Hx of LEEP  Plans breastfeeding, epidural, Mirena     PNC: Rh positive, Rubella immune  Genetics: declines  Imaging: dating US 9w1d, anatomy survey normal after follow up  Immunizations: s/p flu, Tdap  RTC weekly until delivery    Shirley Pardo MD  OB/GYN  2020 2:26 PM

## 2020-06-02 NOTE — NURSING NOTE
Chief Complaint   Patient presents with     Prenatal Care     36w5        Medication Reconciliation: completed   Marilyn Clemons LPN  6/2/2020 1:52 PM

## 2020-06-05 ENCOUNTER — PRENATAL OFFICE VISIT (OUTPATIENT)
Dept: OBGYN | Facility: OTHER | Age: 30
End: 2020-06-05
Attending: OBSTETRICS & GYNECOLOGY
Payer: COMMERCIAL

## 2020-06-05 VITALS
DIASTOLIC BLOOD PRESSURE: 60 MMHG | SYSTOLIC BLOOD PRESSURE: 104 MMHG | BODY MASS INDEX: 23.86 KG/M2 | HEART RATE: 100 BPM | WEIGHT: 139 LBS

## 2020-06-05 DIAGNOSIS — O26.843 UTERINE SIZE-DATE DISCREPANCY IN THIRD TRIMESTER: Primary | ICD-10-CM

## 2020-06-05 PROCEDURE — 99207 ZZC OB VISIT-NO CHARGE - GICH ONLY: CPT | Performed by: OBSTETRICS & GYNECOLOGY

## 2020-06-05 ASSESSMENT — PAIN SCALES - GENERAL: PAINLEVEL: MILD PAIN (3)

## 2020-06-05 NOTE — PROGRESS NOTES
Return OB Visit    S: Patient is feeling the same. Still humble but no regular pattern. No VB or LOF. +FM    O: /60 (BP Location: Right arm, Patient Position: Sitting, Cuff Size: Adult Regular)   Pulse 100   Wt 63 kg (139 lb)   LMP 2019 (Exact Date)   Breastfeeding No   BMI 23.86 kg/m    Gen: Well-appearing, NAD  See OB Flowsheet    A/P:  Romelia Bhandari is a 30 year old  at 37w1d by LMP c/w 9w1d US, here for return OB visit.  Hx of IUGR: growth US 2020 1934g (36%ile). Will repeat next week  Hx of LEEP  Plans breastfeeding, epidural, Mirena     PNC: Rh positive, Rubella immune  Genetics: declines  Imaging: dating US 9w1d, anatomy survey normal after follow up  Immunizations: s/p flu, Tdap  RTC weekly until delivery    Shirley Pardo MD  OB/GYN  2020 1:54 PM

## 2020-06-05 NOTE — NURSING NOTE
Chief Complaint   Patient presents with     Prenatal Care     37w1d     Still having contractions that are not regular    Medication Reconciliation: completed   Marilyn Clemons LPN  6/5/2020 1:54 PM

## 2020-06-12 ENCOUNTER — HOSPITAL ENCOUNTER (OUTPATIENT)
Dept: ULTRASOUND IMAGING | Facility: OTHER | Age: 30
End: 2020-06-12
Attending: OBSTETRICS & GYNECOLOGY
Payer: COMMERCIAL

## 2020-06-12 ENCOUNTER — PRENATAL OFFICE VISIT (OUTPATIENT)
Dept: OBGYN | Facility: OTHER | Age: 30
End: 2020-06-12
Attending: OBSTETRICS & GYNECOLOGY
Payer: COMMERCIAL

## 2020-06-12 VITALS
SYSTOLIC BLOOD PRESSURE: 102 MMHG | DIASTOLIC BLOOD PRESSURE: 60 MMHG | WEIGHT: 141 LBS | BODY MASS INDEX: 24.2 KG/M2 | HEART RATE: 64 BPM

## 2020-06-12 DIAGNOSIS — O26.843 UTERINE SIZE-DATE DISCREPANCY IN THIRD TRIMESTER: ICD-10-CM

## 2020-06-12 DIAGNOSIS — O09.93 SUPERVISION OF HIGH RISK PREGNANCY IN THIRD TRIMESTER: Primary | ICD-10-CM

## 2020-06-12 PROCEDURE — 99207 ZZC OB VISIT-NO CHARGE - GICH ONLY: CPT | Performed by: OBSTETRICS & GYNECOLOGY

## 2020-06-12 PROCEDURE — 76816 OB US FOLLOW-UP PER FETUS: CPT

## 2020-06-12 ASSESSMENT — PAIN SCALES - GENERAL: PAINLEVEL: MILD PAIN (3)

## 2020-06-12 NOTE — NURSING NOTE
Chief Complaint   Patient presents with     Prenatal Care     38w1d     Increased pelvic and back pain along.   Marilyn Clemons LPN........................6/12/2020  3:26 PM   Medication Reconciliation: completed   Marilyn Clemons LPN  6/12/2020 3:25 PM

## 2020-06-12 NOTE — PROGRESS NOTES
Return OB Visit    S: Patient is feeling well, just uncomfortable. More ctx. No VB or LOF. +FM    O: /60 (BP Location: Right arm, Patient Position: Sitting, Cuff Size: Adult Regular)   Pulse 64   Wt 64 kg (141 lb)   LMP 2019 (Exact Date)   Breastfeeding No   BMI 24.20 kg/m    Gen: Well-appearing, NAD  See OB Flowsheet    A/P:  Romelia Bhandari is a 30 year old  at 38w1d by LMP c/w 9w1d US, here for return OB visit.  Hx of IUGR: growth US 2020 2895g (42%ile).   Hx of LEEP  Plans breastfeeding, epidural, Mirena     PNC: Rh positive, Rubella immune  Genetics: declines  Imaging: dating US 9w1d, anatomy survey normal after follow up  Immunizations: s/p flu, Tdap  RTC weekly until delivery    Shirley Pardo MD  OB/GYN  2020 3:33 PM

## 2020-06-19 ENCOUNTER — ANESTHESIA EVENT (OUTPATIENT)
Dept: OBGYN | Facility: OTHER | Age: 30
End: 2020-06-19
Payer: COMMERCIAL

## 2020-06-19 ENCOUNTER — HOSPITAL ENCOUNTER (INPATIENT)
Facility: OTHER | Age: 30
LOS: 1 days | Discharge: HOME | End: 2020-06-20
Attending: OBSTETRICS & GYNECOLOGY | Admitting: OBSTETRICS & GYNECOLOGY
Payer: COMMERCIAL

## 2020-06-19 ENCOUNTER — PRENATAL OFFICE VISIT (OUTPATIENT)
Dept: OBGYN | Facility: OTHER | Age: 30
End: 2020-06-19
Attending: OBSTETRICS & GYNECOLOGY
Payer: COMMERCIAL

## 2020-06-19 ENCOUNTER — ANESTHESIA (OUTPATIENT)
Dept: OBGYN | Facility: OTHER | Age: 30
End: 2020-06-19
Payer: COMMERCIAL

## 2020-06-19 VITALS
SYSTOLIC BLOOD PRESSURE: 106 MMHG | HEART RATE: 79 BPM | DIASTOLIC BLOOD PRESSURE: 70 MMHG | WEIGHT: 143.3 LBS | BODY MASS INDEX: 24.6 KG/M2

## 2020-06-19 DIAGNOSIS — Z01.812 PRE-PROCEDURE LAB EXAM: ICD-10-CM

## 2020-06-19 DIAGNOSIS — O09.93 SUPERVISION OF HIGH RISK PREGNANCY IN THIRD TRIMESTER: ICD-10-CM

## 2020-06-19 DIAGNOSIS — O09.93 SUPERVISION OF HIGH RISK PREGNANCY IN THIRD TRIMESTER: Primary | ICD-10-CM

## 2020-06-19 PROBLEM — Z34.90 ENCOUNTER FOR ELECTIVE INDUCTION OF LABOR: Status: ACTIVE | Noted: 2020-06-19

## 2020-06-19 LAB
A1 MICROGLOB PLACENTAL VAG QL: POSITIVE
ABO + RH BLD: NORMAL
ABO + RH BLD: NORMAL
BASOPHILS # BLD AUTO: 0 10E9/L (ref 0–0.2)
BASOPHILS NFR BLD AUTO: 0.3 %
BLD GP AB SCN SERPL QL: NORMAL
BLOOD BANK CMNT PATIENT-IMP: NORMAL
DIFFERENTIAL METHOD BLD: NORMAL
EOSINOPHIL # BLD AUTO: 0.1 10E9/L (ref 0–0.7)
EOSINOPHIL NFR BLD AUTO: 0.9 %
ERYTHROCYTE [DISTWIDTH] IN BLOOD BY AUTOMATED COUNT: 12.3 % (ref 10–15)
HCT VFR BLD AUTO: 38.2 % (ref 35–47)
HGB BLD-MCNC: 12.6 G/DL (ref 11.7–15.7)
IMM GRANULOCYTES # BLD: 0.1 10E9/L (ref 0–0.4)
IMM GRANULOCYTES NFR BLD: 0.5 %
LYMPHOCYTES # BLD AUTO: 2.1 10E9/L (ref 0.8–5.3)
LYMPHOCYTES NFR BLD AUTO: 23.2 %
MCH RBC QN AUTO: 30.5 PG (ref 26.5–33)
MCHC RBC AUTO-ENTMCNC: 33 G/DL (ref 31.5–36.5)
MCV RBC AUTO: 93 FL (ref 78–100)
MONOCYTES # BLD AUTO: 0.7 10E9/L (ref 0–1.3)
MONOCYTES NFR BLD AUTO: 7.8 %
NEUTROPHILS # BLD AUTO: 6.2 10E9/L (ref 1.6–8.3)
NEUTROPHILS NFR BLD AUTO: 67.3 %
PLATELET # BLD AUTO: 187 10E9/L (ref 150–450)
RBC # BLD AUTO: 4.13 10E12/L (ref 3.8–5.2)
SPECIMEN EXP DATE BLD: NORMAL
WBC # BLD AUTO: 9.2 10E9/L (ref 4–11)

## 2020-06-19 PROCEDURE — 99207 ZZC OB VISIT-NO CHARGE - GICH ONLY: CPT | Performed by: OBSTETRICS & GYNECOLOGY

## 2020-06-19 PROCEDURE — 86900 BLOOD TYPING SEROLOGIC ABO: CPT | Performed by: OBSTETRICS & GYNECOLOGY

## 2020-06-19 PROCEDURE — 72200001 ZZH LABOR CARE VAGINAL DELIVERY SINGLE

## 2020-06-19 PROCEDURE — 25000132 ZZH RX MED GY IP 250 OP 250 PS 637: Performed by: OBSTETRICS & GYNECOLOGY

## 2020-06-19 PROCEDURE — 59400 OBSTETRICAL CARE: CPT | Performed by: NURSE ANESTHETIST, CERTIFIED REGISTERED

## 2020-06-19 PROCEDURE — 37000011 ZZH ANESTHESIA WARD SERVICE

## 2020-06-19 PROCEDURE — 36415 COLL VENOUS BLD VENIPUNCTURE: CPT | Performed by: OBSTETRICS & GYNECOLOGY

## 2020-06-19 PROCEDURE — 86780 TREPONEMA PALLIDUM: CPT | Performed by: OBSTETRICS & GYNECOLOGY

## 2020-06-19 PROCEDURE — 85025 COMPLETE CBC W/AUTO DIFF WBC: CPT | Performed by: OBSTETRICS & GYNECOLOGY

## 2020-06-19 PROCEDURE — 0UQMXZZ REPAIR VULVA, EXTERNAL APPROACH: ICD-10-PCS | Performed by: OBSTETRICS & GYNECOLOGY

## 2020-06-19 PROCEDURE — 86901 BLOOD TYPING SEROLOGIC RH(D): CPT | Performed by: OBSTETRICS & GYNECOLOGY

## 2020-06-19 PROCEDURE — 25800030 ZZH RX IP 258 OP 636: Performed by: OBSTETRICS & GYNECOLOGY

## 2020-06-19 PROCEDURE — 25000125 ZZHC RX 250: Performed by: OBSTETRICS & GYNECOLOGY

## 2020-06-19 PROCEDURE — 86850 RBC ANTIBODY SCREEN: CPT | Performed by: OBSTETRICS & GYNECOLOGY

## 2020-06-19 PROCEDURE — 59400 OBSTETRICAL CARE: CPT | Performed by: OBSTETRICS & GYNECOLOGY

## 2020-06-19 PROCEDURE — 84112 EVAL AMNIOTIC FLUID PROTEIN: CPT | Mod: ZL | Performed by: OBSTETRICS & GYNECOLOGY

## 2020-06-19 PROCEDURE — 12000000 ZZH R&B MED SURG/OB

## 2020-06-19 PROCEDURE — 25000128 H RX IP 250 OP 636: Performed by: NURSE ANESTHETIST, CERTIFIED REGISTERED

## 2020-06-19 RX ORDER — NALOXONE HYDROCHLORIDE 0.4 MG/ML
.1-.4 INJECTION, SOLUTION INTRAMUSCULAR; INTRAVENOUS; SUBCUTANEOUS
Status: CANCELLED | OUTPATIENT
Start: 2020-06-19

## 2020-06-19 RX ORDER — SODIUM CHLORIDE, SODIUM LACTATE, POTASSIUM CHLORIDE, CALCIUM CHLORIDE 600; 310; 30; 20 MG/100ML; MG/100ML; MG/100ML; MG/100ML
INJECTION, SOLUTION INTRAVENOUS CONTINUOUS
Status: DISCONTINUED | OUTPATIENT
Start: 2020-06-19 | End: 2020-06-19

## 2020-06-19 RX ORDER — TERBUTALINE SULFATE 1 MG/ML
0.25 INJECTION, SOLUTION SUBCUTANEOUS
Status: DISCONTINUED | OUTPATIENT
Start: 2020-06-19 | End: 2020-06-19

## 2020-06-19 RX ORDER — NALOXONE HYDROCHLORIDE 0.4 MG/ML
.1-.4 INJECTION, SOLUTION INTRAMUSCULAR; INTRAVENOUS; SUBCUTANEOUS
Status: DISCONTINUED | OUTPATIENT
Start: 2020-06-19 | End: 2020-06-19

## 2020-06-19 RX ORDER — NALOXONE HYDROCHLORIDE 0.4 MG/ML
.1-.4 INJECTION, SOLUTION INTRAMUSCULAR; INTRAVENOUS; SUBCUTANEOUS
Status: DISCONTINUED | OUTPATIENT
Start: 2020-06-19 | End: 2020-06-20 | Stop reason: HOSPADM

## 2020-06-19 RX ORDER — IBUPROFEN 400 MG/1
800 TABLET, FILM COATED ORAL EVERY 6 HOURS PRN
Status: DISCONTINUED | OUTPATIENT
Start: 2020-06-20 | End: 2020-06-20 | Stop reason: HOSPADM

## 2020-06-19 RX ORDER — METHYLERGONOVINE MALEATE 0.2 MG/ML
200 INJECTION INTRAVENOUS
Status: DISCONTINUED | OUTPATIENT
Start: 2020-06-19 | End: 2020-06-19

## 2020-06-19 RX ORDER — ACETAMINOPHEN 325 MG/1
650 TABLET ORAL EVERY 4 HOURS PRN
Status: CANCELLED | OUTPATIENT
Start: 2020-06-19

## 2020-06-19 RX ORDER — AMOXICILLIN 250 MG
1 CAPSULE ORAL 2 TIMES DAILY
Status: DISCONTINUED | OUTPATIENT
Start: 2020-06-19 | End: 2020-06-20 | Stop reason: HOSPADM

## 2020-06-19 RX ORDER — IBUPROFEN 400 MG/1
800 TABLET, FILM COATED ORAL
Status: COMPLETED | OUTPATIENT
Start: 2020-06-19 | End: 2020-06-19

## 2020-06-19 RX ORDER — CARBOPROST TROMETHAMINE 250 UG/ML
250 INJECTION, SOLUTION INTRAMUSCULAR
Status: DISCONTINUED | OUTPATIENT
Start: 2020-06-19 | End: 2020-06-19

## 2020-06-19 RX ORDER — IBUPROFEN 200 MG
800 TABLET ORAL
Status: CANCELLED | OUTPATIENT
Start: 2020-06-19

## 2020-06-19 RX ORDER — ACETAMINOPHEN 325 MG/1
650 TABLET ORAL EVERY 4 HOURS PRN
Status: DISCONTINUED | OUTPATIENT
Start: 2020-06-19 | End: 2020-06-20 | Stop reason: HOSPADM

## 2020-06-19 RX ORDER — LIDOCAINE 40 MG/G
CREAM TOPICAL
Status: CANCELLED | OUTPATIENT
Start: 2020-06-19

## 2020-06-19 RX ORDER — SODIUM CHLORIDE, SODIUM LACTATE, POTASSIUM CHLORIDE, CALCIUM CHLORIDE 600; 310; 30; 20 MG/100ML; MG/100ML; MG/100ML; MG/100ML
INJECTION, SOLUTION INTRAVENOUS CONTINUOUS
Status: CANCELLED | OUTPATIENT
Start: 2020-06-19

## 2020-06-19 RX ORDER — ACETAMINOPHEN 325 MG/1
650 TABLET ORAL EVERY 4 HOURS PRN
Status: DISCONTINUED | OUTPATIENT
Start: 2020-06-19 | End: 2020-06-19

## 2020-06-19 RX ORDER — HYDROCORTISONE 2.5 %
CREAM (GRAM) TOPICAL 3 TIMES DAILY PRN
Status: DISCONTINUED | OUTPATIENT
Start: 2020-06-19 | End: 2020-06-20 | Stop reason: HOSPADM

## 2020-06-19 RX ORDER — ONDANSETRON 2 MG/ML
4 INJECTION INTRAMUSCULAR; INTRAVENOUS EVERY 6 HOURS PRN
Status: DISCONTINUED | OUTPATIENT
Start: 2020-06-19 | End: 2020-06-19

## 2020-06-19 RX ORDER — AMOXICILLIN 250 MG
2 CAPSULE ORAL 2 TIMES DAILY
Status: DISCONTINUED | OUTPATIENT
Start: 2020-06-19 | End: 2020-06-20 | Stop reason: HOSPADM

## 2020-06-19 RX ORDER — OXYTOCIN 10 [USP'U]/ML
10 INJECTION, SOLUTION INTRAMUSCULAR; INTRAVENOUS
Status: DISCONTINUED | OUTPATIENT
Start: 2020-06-19 | End: 2020-06-19

## 2020-06-19 RX ORDER — OXYTOCIN 10 [USP'U]/ML
10 INJECTION, SOLUTION INTRAMUSCULAR; INTRAVENOUS
Status: CANCELLED | OUTPATIENT
Start: 2020-06-19

## 2020-06-19 RX ORDER — ONDANSETRON 2 MG/ML
4 INJECTION INTRAMUSCULAR; INTRAVENOUS EVERY 6 HOURS PRN
Status: CANCELLED | OUTPATIENT
Start: 2020-06-19

## 2020-06-19 RX ORDER — TERBUTALINE SULFATE 1 MG/ML
0.25 INJECTION, SOLUTION SUBCUTANEOUS
Status: CANCELLED | OUTPATIENT
Start: 2020-06-19

## 2020-06-19 RX ORDER — METHYLERGONOVINE MALEATE 0.2 MG/ML
200 INJECTION INTRAVENOUS
Status: CANCELLED | OUTPATIENT
Start: 2020-06-19

## 2020-06-19 RX ORDER — OXYCODONE AND ACETAMINOPHEN 5; 325 MG/1; MG/1
1 TABLET ORAL
Status: CANCELLED | OUTPATIENT
Start: 2020-06-19

## 2020-06-19 RX ORDER — OXYCODONE AND ACETAMINOPHEN 5; 325 MG/1; MG/1
1 TABLET ORAL
Status: DISCONTINUED | OUTPATIENT
Start: 2020-06-19 | End: 2020-06-19

## 2020-06-19 RX ORDER — BISACODYL 10 MG
10 SUPPOSITORY, RECTAL RECTAL DAILY PRN
Status: DISCONTINUED | OUTPATIENT
Start: 2020-06-21 | End: 2020-06-20 | Stop reason: HOSPADM

## 2020-06-19 RX ORDER — FENTANYL CITRATE 50 UG/ML
25 INJECTION, SOLUTION INTRAMUSCULAR; INTRAVENOUS ONCE
Status: COMPLETED | OUTPATIENT
Start: 2020-06-19 | End: 2020-06-19

## 2020-06-19 RX ORDER — OXYTOCIN 10 [USP'U]/ML
10 INJECTION, SOLUTION INTRAMUSCULAR; INTRAVENOUS
Status: DISCONTINUED | OUTPATIENT
Start: 2020-06-19 | End: 2020-06-20 | Stop reason: HOSPADM

## 2020-06-19 RX ORDER — LIDOCAINE 40 MG/G
CREAM TOPICAL
Status: DISCONTINUED | OUTPATIENT
Start: 2020-06-19 | End: 2020-06-19

## 2020-06-19 RX ORDER — MODIFIED LANOLIN
OINTMENT (GRAM) TOPICAL
Status: DISCONTINUED | OUTPATIENT
Start: 2020-06-19 | End: 2020-06-20 | Stop reason: HOSPADM

## 2020-06-19 RX ORDER — IBUPROFEN 400 MG/1
800 TABLET, FILM COATED ORAL EVERY 6 HOURS PRN
Status: DISCONTINUED | OUTPATIENT
Start: 2020-06-19 | End: 2020-06-19

## 2020-06-19 RX ORDER — BUPIVACAINE HYDROCHLORIDE 7.5 MG/ML
INJECTION, SOLUTION INTRASPINAL PRN
Status: DISCONTINUED | OUTPATIENT
Start: 2020-06-19 | End: 2020-06-19

## 2020-06-19 RX ORDER — CARBOPROST TROMETHAMINE 250 UG/ML
250 INJECTION, SOLUTION INTRAMUSCULAR
Status: CANCELLED | OUTPATIENT
Start: 2020-06-19

## 2020-06-19 RX ADMIN — Medication 100 ML/HR: at 18:08

## 2020-06-19 RX ADMIN — BUPIVACAINE HYDROCHLORIDE IN DEXTROSE 0.4 ML: 7.5 INJECTION, SOLUTION SUBARACHNOID at 17:56

## 2020-06-19 RX ADMIN — IBUPROFEN 800 MG: 400 TABLET, FILM COATED ORAL at 19:04

## 2020-06-19 RX ADMIN — FENTANYL CITRATE 25 MCG: 50 INJECTION, SOLUTION INTRAMUSCULAR; INTRAVENOUS at 17:56

## 2020-06-19 RX ADMIN — SODIUM CHLORIDE, POTASSIUM CHLORIDE, SODIUM LACTATE AND CALCIUM CHLORIDE: 600; 310; 30; 20 INJECTION, SOLUTION INTRAVENOUS at 17:15

## 2020-06-19 ASSESSMENT — PAIN SCALES - GENERAL: PAINLEVEL: MODERATE PAIN (4)

## 2020-06-19 NOTE — H&P
[facility] Labor and Delivery History and Physical    Romelia Bhandari MRN# [id number]   Age: 30 year old YOB: 1990     Date of Admission:  2020    Primary care provider: Mega Patel           Chief Complaint:   Romelia Bhandari is a 30 year old  at 39w1d by LMP c/w 9w1d US admitted for SROM. Reported increased watery discharge today, was found to have a positive amnisure.          Pregnancy history:     OBSTETRIC HISTORY:    OB History    Para Term  AB Living   2 1 1 0 0 1   SAB TAB Ectopic Multiple Live Births   0 0 0 0 1      # Outcome Date GA Lbr Dewey/2nd Weight Sex Delivery Anes PTL Lv   2 Current            1 Term 18 38w0d 06:45 / 00:38 2.761 kg (6 lb 1.4 oz) F Vag-Vacuum EPI N JONH      Birth Comments: head molding and bruising      Complications: Fetal Intolerance      Name: ELIAS BHANDARI      Apgar1: 7  Apgar5: 8       EDC: Estimated Date of Delivery: 2020    Prenatal Labs:   Lab Results   Component Value Date    ABO A 2019    RH Pos 2019    AS Neg 2019    HEPBANG Nonreactive 2019    TREPAB Negative 2018    RPR Nonreactive 2019    RPR Nonreactive 2019    HGB 11.5 (L) 2020       GBS Status: negative    Active Problem List  Patient Active Problem List   Diagnosis     Fetal distress affecting care     Encounter for elective induction of labor       Medication Prior to Admission  Medications Prior to Admission   Medication Sig Dispense Refill Last Dose     albuterol (PROAIR HFA/PROVENTIL HFA/VENTOLIN HFA) 108 (90 BASE) MCG/ACT Inhaler Inhale 2 puffs into the lungs 4 times daily as needed for wheezing        Prenatal Vit-Fe Fumarate-FA (PRENATAL MULTIVITAMIN W/IRON) 27-0.8 MG tablet Take 1 tablet by mouth daily      .        Maternal Past Medical History:     Past Medical History:   Diagnosis Date     H/O abnormal cervical Papanicolaou smear      H/O LEEP      Past  Surgical History:   Procedure Laterality Date     EXTRACTION(S) DENTAL      age 16,no anesthetic problems                       Family History:   No family history on file.  Family history reviewed and updated in [facility]            Social History:     Social History     Tobacco Use     Smoking status: Never Smoker     Smokeless tobacco: Never Used   Substance Use Topics     Alcohol use: No            Review of Systems:   The Review of Systems is negative other than noted in the HPI          Physical Exam:   No data found.  Gen: resting in bed, NAD  CV: RRR  Resp: CTAB  Abd: gravid, soft, nontender.   Ext: nontender    Cervix: 6/100/0  Membranes: SROM  EFW: 3000g  Presentation:Cephalic    Fetal Heart Rate Tracin, mod variability, + accels, no decels  Tocometer: 4        Assessment:   Romelia Bhandari is a 30 year old  at 39w1d admitted with SROM.          Plan:   FWB: Cat I, reactive. EFW 3000g  Labor: will start pitocin  Pain: plans epidural  Rh positive, RI, GBS negative  Anticipate     Shirley Pardo MD

## 2020-06-19 NOTE — ANESTHESIA PREPROCEDURE EVALUATION
Anesthesia Pre-Procedure Evaluation    Patient: Romelia Bhandari   MRN: [id number] : 1990          Preoperative Diagnosis: * No pre-op diagnosis entered *    * No procedures listed *    Past Medical History:   Diagnosis Date     H/O abnormal cervical Papanicolaou smear      H/O LEEP      Past Surgical History:   Procedure Laterality Date     EXTRACTION(S) DENTAL      age 16,no anesthetic problems       Anesthesia Evaluation     . Pt has had prior anesthetic.            ROS/MED HX    ENT/Pulmonary:     (+)asthma , . .    Neurologic:  - neg neurologic ROS     Cardiovascular:  - neg cardiovascular ROS       METS/Exercise Tolerance:  >4 METS   Hematologic:  - neg hematologic  ROS       Musculoskeletal:  - neg musculoskeletal ROS       GI/Hepatic:  - neg GI/hepatic ROS       Renal/Genitourinary:  - ROS Renal section negative       Endo:  - neg endo ROS       Psychiatric:  - neg psychiatric ROS       Infectious Disease:  - neg infectious disease ROS       Malignancy:      - no malignancy   Other:    (+) Possibly pregnant                         Physical Exam  Normal systems: cardiovascular, pulmonary and dental    Airway   Mallampati: I  TM distance: >3 FB  Neck ROM: full    Dental     Cardiovascular   Rhythm and rate: regular and normal      Pulmonary    breath sounds clear to auscultation            Lab Results   Component Value Date    WBC 9.2 2020    HGB 12.6 2020    HCT 38.2 2020     2020    HCG Positive (A) 10/25/2019       Preop Vitals  BP Readings from Last 3 Encounters:   20 106/70   20 102/60   20 104/60    Pulse Readings from Last 3 Encounters:   20 79   20 64   20 100      Resp Readings from Last 3 Encounters:   18 18   18 18    SpO2 Readings from Last 3 Encounters:   20 99%   18 97%   18 100%      Temp Readings from Last 1 Encounters:   18 98.1  F (36.7  C)    Ht Readings from Last 1 Encounters:  "  11/22/19 1.626 m (5' 4\")      Wt Readings from Last 1 Encounters:   06/19/20 65 kg (143 lb 4.8 oz)    Estimated body mass index is 24.6 kg/m  as calculated from the following:    Height as of 11/22/19: 1.626 m (5' 4\").    Weight as of an earlier encounter on 6/19/20: 65 kg (143 lb 4.8 oz).       Anesthesia Plan      History & Physical Review      ASA Status:  2 .    NPO Status:  > 4 hours    Plan for Spinal            Postoperative Care      Consents  Anesthetic plan, risks, benefits and alternatives discussed with:  Patient..                 David Kellerman, APRN CRNA  "

## 2020-06-19 NOTE — ANESTHESIA PROCEDURE NOTES
Procedure note : intrathecal  Staff -       CRNA: Kellerman, David, APRN CRNA    Performed By: CRNA        Pre-Procedure    Location: OB    Procedure Times:6/19/2020 5:50 PM and 6/19/2020 5:57 PM  Pre-Anesthestic Checklist: patient identified, IV checked, site marked, risks and benefits discussed, informed consent, monitors and equipment checked and pre-op evaluation    Timeout  Correct Patient: Yes   Correct Procedure: Yes   Correct Site: Yes   Correct Laterality: Yes   Correct Position: Yes     .   Procedure Documentation  ASA 2  Diagnosis:Labor pain.    Procedure: intrathecal, .   Patient Position:sitting Insertion Site:L2-3  (midline approach)     Patient Prep/Sterile Barriers; povidone-iodine 7.5% surgical scrub.  .  Needle:  Spinal Needle (gauge): 25  Spinal/LP Needle Length (inches): 3.5 # of attempts: 1 and # of redirects:  No introducer used .        Assessment/Narrative  Paresthesias: No.  .  .  clear CSF fluid removed .     6/19/2020 5:56 PM

## 2020-06-19 NOTE — PROGRESS NOTES
Return OB Visit    S: Patient is feeling well, ready to be done. More ctx. No VB but had pinkish mucous today. Seemed more watery. +FM    O: /70 (BP Location: Right arm, Patient Position: Sitting, Cuff Size: Adult Regular)   Pulse 79   Wt 65 kg (143 lb 4.8 oz)   LMP 2019 (Exact Date)   Breastfeeding No   BMI 24.60 kg/m    Gen: Well-appearing, NAD  See OB Flowsheet    A/P:  Romelia Bhandari is a 30 year old  at 39w1d by LMP c/w 9w1d US, here for return OB visit.  Hx of IUGR: growth US 2020 2895g (42%ile).   Hx of LEEP  Plans breastfeeding, epidural, Mirena     PNC: Rh positive, Rubella immune  Genetics: declines  Imaging: dating US 9w1d, anatomy survey normal after follow up  Immunizations: s/p flu, Tdap  IOL scheduled for 20    Shirley Pardo MD  OB/GYN  2020 3:48 PM       Addendum:  Called with positive amnisure. Will return to [facility] for IOL    Shirley Pardo MD  OB/GYN  2020 4:18 PM

## 2020-06-19 NOTE — NURSING NOTE
Chief Complaint   Patient presents with     Prenatal Care     39w 1d      Very uncomfortable. Active babe. Has had some contractions intermittently No pattern. Some leaking this last week, more so today. Pinkish.     Medication Reconciliation: complete    Brenda Landrum LPN

## 2020-06-19 NOTE — L&D DELIVERY NOTE
OB Vaginal Delivery Note    Romelia Bhandari MRN# [id number]   Age: 30 year old YOB: 1990       GA: 39w1d  GP:   Labor Complications: None   EBL: 100  mL  Delivery QBL:    Delivery Type: Vaginal, Spontaneous   ROM to Delivery Time: rupture date or rupture time have not been documented   Weight:     1 Minute 5 Minute 10 Minute   Apgar Totals: 7   9             Delivery Details:  Romelia Bhandari, a 30 year old  who presented for SROM at 39w1d. She had her membranes swept in clinic, progressed into spontaneous labor. She had 1 hour of active labor and delivered a vigorous female infant, HODAN position, without complication. Placenta delivered spontaneously and appeared intact. periclitoral laceration was bleeding and repaired with 4-0 vicryl. .    Shirley Pardo MD  OB/GYN  2020 6:24 PM          Delivery/Placenta Date and Time    Delivery Date:  20 Delivery Time:   6:08 PM      Vaginal Counts     Initial count performed by 2 team members:   Two Team Members   JUAN DIEGO Portillo RN       Needles Suture Marion Sponges Instruments   Initial counts 1 0 6 13   Added to count 0      Final counts       Placed during labor Accounted for at the end of labor           Apgars    Living status:  Living   1 Minute 5 Minute 10 Minute 15 Minute 20 Minute   Skin color: 1  1       Heart rate: 1  2       Reflex irritability: 2  2       Muscle tone: 2  2       Respiratory effort: 1  2       Total: 7  9       Apgars assigned by:  SAM VASQUEZ RN     Labor Events and Shoulder Dystocia    Fetal Tracing Prior to Delivery:  Category 1  Shoulder dystocia present?:  Neg     Delivery (Maternal) (Provider to Complete) ([id number])    Episiotomy:  None  Perineal lacerations:  None    Periurethral laceration:  left Repaired?:  Yes   Est. blood loss (mL):  100     Blood Loss  Mother: Romelia Bhandari #[id number]   Start of Mother's Information    IO Blood Loss  20 0608 -  06/19/20 1823    EBL (mL) Hospital Encounter 100 mL    Total  100 mL         End of Mother's Information  Mother: Romelia Bhandari #[id number]         Delivery - Provider to Complete ([id number])    Delivering clinician:  Shirley Pardo MD  Delivery Type (Choose the 1 that will go to the Birth History):  Vaginal, Spontaneous          Placenta    Delayed Cord Clamping:  Done  Removal:  Expressed  Disposition:  Hospital disposal     Presentation and Position    Presentation:  Vertex  Position:  Left Occiput Anterior           Shirley Pardo MD

## 2020-06-19 NOTE — NURSING NOTE
Induction or  date: 2020  In the past 24 hours have you had shortness of breath when resting, speaking, walking, or climbing stairs? no  Do you have a cough? no  Do you have a fever greater than 100? no  Have you been exposed to anyone with a positive test result for Covid 19? no    Patient was given the following instructions:  Once your screening swab has been completed, you should, as much as possible, stay in a specific room and away from other people and pets in your home. If possible, you should use a separate bathroom. Avoid sharing dishes, drinking glasses, cups, eating utensils, towels, or bedding with other people in your home. Wash these items thoroughly after using them with soap and water or put in the . If you need to be around other people or animals in or outside of the home, wear a cloth face covering.    Order placed for Covid 19 testing for 72 hours prior to planned Induction or .  Unit 5 AFR will assist patient to schedule appointment for Curbside testing.    Shirlene Domínguez RN 2020 3:47 PM

## 2020-06-20 VITALS
HEART RATE: 77 BPM | SYSTOLIC BLOOD PRESSURE: 104 MMHG | RESPIRATION RATE: 16 BRPM | TEMPERATURE: 99 F | DIASTOLIC BLOOD PRESSURE: 58 MMHG | OXYGEN SATURATION: 98 %

## 2020-06-20 LAB — HGB BLD-MCNC: 11.7 G/DL (ref 11.7–15.7)

## 2020-06-20 PROCEDURE — 99207 ZZC NO CHARGE LOS: CPT | Performed by: FAMILY MEDICINE

## 2020-06-20 PROCEDURE — 72200001 ZZH LABOR CARE VAGINAL DELIVERY SINGLE

## 2020-06-20 PROCEDURE — 25000132 ZZH RX MED GY IP 250 OP 250 PS 637: Performed by: OBSTETRICS & GYNECOLOGY

## 2020-06-20 PROCEDURE — 85018 HEMOGLOBIN: CPT | Performed by: OBSTETRICS & GYNECOLOGY

## 2020-06-20 PROCEDURE — 36415 COLL VENOUS BLD VENIPUNCTURE: CPT | Performed by: OBSTETRICS & GYNECOLOGY

## 2020-06-20 RX ORDER — ACETAMINOPHEN 325 MG/1
650 TABLET ORAL EVERY 4 HOURS PRN
COMMUNITY
Start: 2020-06-20 | End: 2023-09-22

## 2020-06-20 RX ORDER — IBUPROFEN 800 MG/1
800 TABLET, FILM COATED ORAL EVERY 6 HOURS PRN
COMMUNITY
Start: 2020-06-20 | End: 2023-09-22

## 2020-06-20 RX ADMIN — IBUPROFEN 800 MG: 400 TABLET, FILM COATED ORAL at 09:59

## 2020-06-20 RX ADMIN — ACETAMINOPHEN 650 MG: 325 TABLET, FILM COATED ORAL at 05:09

## 2020-06-20 RX ADMIN — DOCUSATE SODIUM 50 MG AND SENNOSIDES 8.6 MG 1 TABLET: 8.6; 5 TABLET, FILM COATED ORAL at 09:59

## 2020-06-20 ASSESSMENT — ACTIVITIES OF DAILY LIVING (ADL)
RETIRED_COMMUNICATION: 0-->UNDERSTANDS/COMMUNICATES WITHOUT DIFFICULTY
COGNITION: 0 - NO COGNITION ISSUES REPORTED
RETIRED_EATING: 0-->INDEPENDENT
FALL_HISTORY_WITHIN_LAST_SIX_MONTHS: NO
DRESS: 0-->INDEPENDENT
BATHING: 0-->INDEPENDENT
AMBULATION: 0-->INDEPENDENT
TOILETING: 0-->INDEPENDENT
SWALLOWING: 0-->SWALLOWS FOODS/LIQUIDS WITHOUT DIFFICULTY
TRANSFERRING: 0-->INDEPENDENT

## 2020-06-20 NOTE — PROGRESS NOTES
Moreno GOMEZ CRNA arrived to place IT. IT placed without difficulty. Pt has great pain control. Dr. PATRICIA Pardo performed cervical check, pt found to be complete. Will break bed down for pushing.

## 2020-06-20 NOTE — PROGRESS NOTES
Pt ambulated to the bathroom, with nurse stand by assist. Pt tolerated well, voided a large amount without difficulty. Pt still slightly unsteady on feet instructed not to get out of bed by herself.

## 2020-06-20 NOTE — PLAN OF CARE
LMP 09/19/2019 (Exact Date)     Pt up to bathroom with stand by assist.  Moderate amount of blood on pad and in toilet.  Pt denies pain medications  from 11p-3am.    Mandi Leroy RN.............................6/20/2020 3:15 AM

## 2020-06-20 NOTE — PLAN OF CARE
Patient doing well utilizing tylenol/ibuprofen for cramping, effective per pt report. Fundus firm U2. Lochia-rubra, small flow. Pt independent with breastfeeding. Independent in room. Attentive to infant. Tolerating regular diet, viding without issue. Patient would like an infant bath demo today in afternoon, patient and  still deciding on if they will stay another night or not. Encouraged patient to start filling out paperwork. Anabel Butt RN .............. 6/20/2020  10:23 AM'

## 2020-06-20 NOTE — ANESTHESIA POSTPROCEDURE EVALUATION
Patient: Romelia Bhandari    * No procedures listed *    Diagnosis:* No pre-op diagnosis entered *  Diagnosis Additional Information: No value filed.    Anesthesia Type:  Spinal    Note:  Anesthesia Post Evaluation    Patient location during evaluation: Bedside ()  Patient participation: Able to fully participate in evaluation  Level of consciousness: awake and alert  Pain management: adequate  Airway patency: patent  Cardiovascular status: acceptable  Respiratory status: acceptable  Hydration status: balanced  PONV: none       Comments: Patient has no complains of headache, patient did state that she had some soreness at the ITN site. Full strength noted in lower extremities. No complains of numbness or tingling with good motor function.    Thank you for allowing us to participate in this patients care.        Last vitals:  Vitals:    06/19/20 2244 06/20/20 0510 06/20/20 0512   BP: 117/59 106/70 107/66   Pulse:  64    Resp:  16    Temp:  97.3  F (36.3  C)    SpO2:  98%          Electronically Signed By: David Kellerman, APRN CRNA  June 20, 2020  8:53 AM

## 2020-06-20 NOTE — PROGRESS NOTES
Single viable baby girl born via spontaneous vaginal delivery on 06/19/20 at 1808. Delivered by Dr. PATRICIA Pardo. Spontaneous respirations, with vigorous cry.   Spontaneous Labor at 39 weeks gestation   Mom's GBS status Negative with antibiotic treatment not indicated 4 hours prior to delivery.   baby placed on mother's abdomen for  ID bands applied to baby,mother, and S.O. Initial    Will continue to monitor and provide interventions as needed.

## 2020-06-20 NOTE — PROGRESS NOTES
Pt arrived to [facility] after SROM at home. She has a history of fast labors. Dr. PATRICIA Pardo performs cervical check pt 6-7. Fluid bolus started, pt would like IT/epidural, anesthesia notified. Category I tracing noted, contractions 3-4.

## 2020-06-20 NOTE — DISCHARGE SUMMARY
[facility]    Discharge Summary  Obstetrics    Date of Admission:  2020  Date of Discharge:  2020  Discharging Provider: Mena Little DO    Discharge Diagnoses       History of Present Illness   Romelia Bhandari is a 30 year old female who presented with SROM with labor onset.    Hospital Course   The patient's hospital course was unremarkable.  She recovered as anticipated and experienced no post-delivery complications.  On discharge, her pain was well controlled. Vaginal bleeding is similar to peak menstrual flow.  Voiding without difficulty.  Ambulating well and tolerating a normal diet.  No fevers.  Breastfeeding well.  Infant is stable.  She was discharged on post-partum day #1.    Post-partum hemoglobin:   Hemoglobin   Date Value Ref Range Status   2020 11.7 11.7 - 15.7 g/dL Final       Chrisney Depression Scale  Thoughts of Harming Self:    Total Score:      Mena Little DO  Family Practice    Discharge Disposition   Discharged to home   Condition at discharge: Stable    Primary Care Physician   Mega Patel    Consultations This Hospital Stay   ANESTHESIOLOGY IP CONSULT  HOME CARE POST PARTUM/ IP CONSULT  LACTATION IP CONSULT    Discharge Orders      Activity    Review discharge instructions     Reason for your hospital stay    Maternity care     Follow Up and recommended labs and tests    Follow up with Dr. Holly Pardo , at [facility], within 6 weeks  for hospital follow- up. No follow up labs or test are needed.     Discharge Instructions - Postpartum visit    Schedule postpartum visit with your provider and return to clinic in 6 weeks.     Diet    Resume previous diet     Discharge Medications   Current Discharge Medication List      START taking these medications    Details   acetaminophen (TYLENOL) 325 MG tablet Take 2 tablets (650 mg) by mouth every 4 hours as needed for mild pain or fever (greater than or equal to  38  C /100.4  F (oral) or 38.5  C/ 101.4  F (core).)  Qty:      Associated Diagnoses:  (spontaneous vaginal delivery)      ibuprofen (ADVIL/MOTRIN) 800 MG tablet Take 1 tablet (800 mg) by mouth every 6 hours as needed for other (cramping)  Qty:      Associated Diagnoses:  (spontaneous vaginal delivery)         CONTINUE these medications which have NOT CHANGED    Details   albuterol (PROAIR HFA/PROVENTIL HFA/VENTOLIN HFA) 108 (90 BASE) MCG/ACT Inhaler Inhale 2 puffs into the lungs 4 times daily as needed for wheezing      Prenatal Vit-Fe Fumarate-FA (PRENATAL MULTIVITAMIN W/IRON) 27-0.8 MG tablet Take 1 tablet by mouth daily           Allergies   Allergies   Allergen Reactions     Penicillins Rash

## 2020-06-21 LAB — T PALLIDUM AB SER QL: NONREACTIVE

## 2020-06-22 ENCOUNTER — HOSPITAL ENCOUNTER (OUTPATIENT)
Dept: OBGYN | Facility: OTHER | Age: 30
End: 2020-06-22
Attending: FAMILY MEDICINE
Payer: COMMERCIAL

## 2020-06-22 ENCOUNTER — LACTATION ENCOUNTER (OUTPATIENT)
Age: 30
End: 2020-06-22

## 2020-06-22 PROCEDURE — S9443 LACTATION CLASS: HCPCS | Performed by: REGISTERED NURSE

## 2020-06-22 NOTE — LACTATION NOTE
This note was copied from a baby's chart.  Outpatient Lactation Visit    Cathy Bhandari  [id number]    Consultation Date: 2020     Reason for Lactation Referral: Initial Lactation Consult    Baby's : 2020    Baby's Current Age: 3 day old  Baby's Gestational Age: Gestational Age: 39w1d    Primary Care Provider: No primary care provider on file.    Presenting Problem (concerns as stated by parent): no concerns    MATERNAL HISTORY   History of Breast Surgery: no  Breast Changes During Pregnancy: no  Breast Feeding History: nursed first child for 9 months  Maternal Meds: daily prenatal vitamin  Pregnancy Complications: none  Anesthesia during labor: intrathecal    MATERNAL ASSESSMENT    Breast Size: average, symmetrical, soft after feeding and filling prior to feeding  Nipple Appearance - Left: slightly cracked, with signs of healing, education on further healing techniques provided  Nipple Appearance - Right: slightly cracked, with signs of healing, education on further healing techniques provided  Nipple Erectility - Left: erect with stimulation  Nipple Erectility - Right: erect with stimulation  Areolas Compressibility: soft  Nipple Size: average  Special Equipment Used: none  Day mother reports milk came in:  Day 3    INFANT ASSESSMENT    Oral Anatomy  Mouth: normal  Palate: normal  Jaw: normal  Tongue: normal  Frenulum: normal   Digital Suck Exam: root    FEEDING   Feeding Time: aggressively for 20 minutes  Position:  cradle  Effort to Latch: awake and alert, latched easily  Duration of Breast Feeding: Right Breast: 0; Left Breast: 20 minutes  Results: excellent breast feed    Volume of Intake:    Birth Weight: 6 lb 8.2 oz    Hospital discharge weight: 6 lb 8.2 oz    Today's Weight 6 lb 3.3 oz    Total Intake: 1 oz  Output: 3-4 soil diapers in last 24 hours, 3-4 wet diapers in last 24 hours    LATCH Score:   Latch: 2 - Good Latch  Audible Swallowin - Spontaneous & frequent  Type of Nipple:  (Breast/Nipple) 2 - Everted  Comfort: 2 - Soft, Nontender  Hold: 2 - No Assist   Total LATCH Score:  10    FEEDING PLAN    Home Feeding Plan: Continue to feed on demand when  elicits feeding cues with deep latch.  Babe should be eating 8-12 times in a 24 hour period.  Exclusivity explained and encouraged in the early weeks to establish breastfeeding and order in milk supply.  Rooming-in encouraged with explanation of the benefits.  Continue to apply expressed breast milk and Lanolin cream to nipples after feedings for healing and comfort.  Postpartum breastfeeding assessment completed and education provided.  Items included in the education are:     proper positioning and latch    effectiveness of feeding    manual expression    handling and storing breastmilk    maintenance of breastfeeding for the first 6 months    sign/symptoms of infant feeding issues requiring referral to qualified health care provider    LACTATION COMMENTS   Deep latch explained for proper positioning of breast in infant's mouth, maximizing milk transfer and comfort.  Reassurance and encouragement provided in regard to mom's concerns about milk supply.  Follow-up support information provided.  Parents plan to keep  Well-Child Check with Dr. Patel as scheduled for 2 week well child check.      Face-to-face Time: 60 minutes with assessment and education.    Mehreen Vargas, RN  2020  3:01 PM

## 2020-06-30 ENCOUNTER — MEDICAL CORRESPONDENCE (OUTPATIENT)
Dept: HEALTH INFORMATION MANAGEMENT | Facility: OTHER | Age: 30
End: 2020-06-30

## 2020-07-31 ENCOUNTER — PRENATAL OFFICE VISIT (OUTPATIENT)
Dept: OBGYN | Facility: OTHER | Age: 30
End: 2020-07-31
Attending: OBSTETRICS & GYNECOLOGY
Payer: COMMERCIAL

## 2020-07-31 VITALS
WEIGHT: 121 LBS | BODY MASS INDEX: 20.77 KG/M2 | SYSTOLIC BLOOD PRESSURE: 116 MMHG | DIASTOLIC BLOOD PRESSURE: 72 MMHG | HEART RATE: 72 BPM

## 2020-07-31 DIAGNOSIS — Z30.011 ENCOUNTER FOR INITIAL PRESCRIPTION OF CONTRACEPTIVE PILLS: ICD-10-CM

## 2020-07-31 DIAGNOSIS — Z01.812 PRE-PROCEDURE LAB EXAM: Primary | ICD-10-CM

## 2020-07-31 LAB — HCG UR QL: NEGATIVE

## 2020-07-31 PROCEDURE — 81025 URINE PREGNANCY TEST: CPT | Mod: ZL | Performed by: OBSTETRICS & GYNECOLOGY

## 2020-07-31 PROCEDURE — 99207 ZZC POST-PARTUM 6 WK VISIT - GICH ONLY: CPT | Performed by: OBSTETRICS & GYNECOLOGY

## 2020-07-31 RX ORDER — ACETAMINOPHEN AND CODEINE PHOSPHATE 120; 12 MG/5ML; MG/5ML
0.35 SOLUTION ORAL DAILY
Qty: 84 TABLET | Refills: 4 | Status: SHIPPED | OUTPATIENT
Start: 2020-07-31 | End: 2021-07-14

## 2020-07-31 ASSESSMENT — PAIN SCALES - GENERAL: PAINLEVEL: NO PAIN (0)

## 2020-07-31 NOTE — NURSING NOTE
Chief Complaint   Patient presents with     Postpartum Care     6 week post partum        Medication Reconciliation: completed   Marilyn Clemons LPN  7/31/2020 10:01 AM

## 2020-07-31 NOTE — PROGRESS NOTES
6 week Postpartum Visit Note    S:  Ms. Romelia Bhandari is a 30 year old  here for her 6-week postpartum checkup.   - Had a  on 20.  - Infant gender:  girl, weight 6 pounds 8.2 oz.  - Feeding Method:  .  Complications reported with feeding:  none, infant thriving .    - Bleeding:  None.  Duration:  Stopped after 5 weeks.  Menses resumed:  No  - Bowel/Urinary problems:  No  - Mood: good. Has more anxiety related to COVID but adjusting well  - Sleep: Good    - Contraception Planned:  POPs  - She  has not had intercourse since delivery..    - Current tobacco use:  No  - Hx of Abuse:  No  ================================================================  ROS: 10 point ROS neg other than the symptoms noted above in the HPI.     O:  /72 (BP Location: Right arm, Patient Position: Sitting, Cuff Size: Adult Regular)   Pulse 72   Wt 54.9 kg (121 lb)   LMP 2019 (Exact Date)   Breastfeeding Yes   BMI 20.77 kg/m    Gen: Well-appearing, NAD  Psych:  Appropriate mood and affect  Breast: Deferred, no concerns  Abd:  Benign, Soft, flat, non-tender and No masses, organomegaly  Pelvic: Well healed perineum. Normal vagina and cervix. Normal sized uterus, nontender, no adnexal masses or tenderness.    A/P:  Ms. Romelia Bhandari is a 30 year old  here for 6 week postpartum visit after . Doing well.  - Contraception: POPs  - Feeding: breast  - Follow-up: 2021 for pap smear    Shirley Pardo MD  OB/GYN  2020 9:44 AM

## 2020-08-20 ENCOUNTER — MEDICAL CORRESPONDENCE (OUTPATIENT)
Dept: HEALTH INFORMATION MANAGEMENT | Facility: OTHER | Age: 30
End: 2020-08-20

## 2020-10-15 ENCOUNTER — MEDICAL CORRESPONDENCE (OUTPATIENT)
Dept: HEALTH INFORMATION MANAGEMENT | Facility: OTHER | Age: 30
End: 2020-10-15

## 2020-11-10 ENCOUNTER — OFFICE VISIT (OUTPATIENT)
Dept: SURGERY | Facility: OTHER | Age: 30
End: 2020-11-10
Attending: SURGERY
Payer: COMMERCIAL

## 2020-11-10 VITALS
WEIGHT: 115 LBS | HEART RATE: 80 BPM | OXYGEN SATURATION: 100 % | SYSTOLIC BLOOD PRESSURE: 88 MMHG | BODY MASS INDEX: 19.74 KG/M2 | RESPIRATION RATE: 16 BRPM | TEMPERATURE: 98.3 F | DIASTOLIC BLOOD PRESSURE: 60 MMHG

## 2020-11-10 DIAGNOSIS — L91.8 SKIN TAG: Primary | ICD-10-CM

## 2020-11-10 PROCEDURE — 17110 DESTRUCTION B9 LES UP TO 14: CPT | Performed by: SURGERY

## 2020-11-10 ASSESSMENT — PAIN SCALES - GENERAL: PAINLEVEL: NO PAIN (0)

## 2020-11-10 NOTE — PATIENT INSTRUCTIONS
What to Expect Following Cryosurgery (Liquid Nitrogen)   What isCryosurgery?   Cryosurgery is a technique for removing skin lesions that primarily involve the surface of the skin, such as warts, seborrheic keratosis, or actinic keratosis. It is a quick method of removing the lesion with minimal scarring.   The liquid nitrogen needs surekha applied long enough to freeze the affected skin. By freezing the skin, a blister is created underneath the lesion. Ideally, as the new skin forms underneath the blister, the abnormal skin on the roof of the blister peelsoff. Occasionally, if the lesion is very thick (such as a large wart), only the surface is blistered off. The base or residual lesion may need to be frozen at another visit.   It takes about one to two weeks for the scabto fall off, which is when the new layer of skin has formed under the blister. Areas of thinner skin, such as the face, may heal a little faster.      What to Expect Over the Next Few Weeks     During Treatment - Area being treated will sting, burn, and then possibly itch.     Immediately After Treatment - Area will be red, sore, and swollen.     Next Day - Blister or blood blister has formed, tendernessstarts to subside. Apply a Band-Aid if necessary.     7 Days - Surface is dark red/brown and scab-like. You can apply an antibacterial ointment, such as Polysporin , but you don't have to.     2 to 4 Weeks - The surface starts to peel off. This may be encouraged gently during bathing, when the scab is softened.     No makeup should be applied until area is fully healed.      How to Take Care of the Skin after Cryosurgery   A Band-Aid can be used for larger blisters or blisters in areas that are more likely to betraumatized -such as fingers and toes. If the area becomes dry or crusted, an ointment (Vaseline , Bacitracin , or Polysporin ) can also be applied.   Cleanse area with a mild cleanser and cool water.   Patthe area dry with a lint-free cloth.    Avoid glycolic acids, Vitamin C, scrubs, Tretinoin (Retin-A), and Retinol creams for 7 to 10 days.  The area may get wet while bathing, but swimming or hot tub use should beavoided for one week following a treatment or while the skin is open.   Within 24 hours, you can expect the area to be swollen and/or blistered. The blister may not be visible to the naked eye.   Within one week, theswelling goes down. The top becomes dark red and scab-like. The scab will loosen over the next weeks, and should fall off within one month.      Adverse Effects   The most common adverse effects are pain,swelling/blistering, potential for infection, and discoloration of the skin after it heals.     Blisters  Anytime a blister surfaces, whether from ill-fitting shoes, an oven burn, or liquid nitrogencryosurgery, it will be a bit painful. For most patients, the pain is a temporary sting with some discomfort periodically over the next day as the blister forms.   The goal is to achieve a blister. This means, mostcommonly, patients will have a blister form following treatment. Sometimes, the blister is so thin that it can't be seen and may have minimal swelling. Occasionally, a blood blister forms that can be quite dramatic but isharmless.   Rarely, the blister may become infected. When this happens, the blister becomes unusually tender, the fluid becomes cloudy, and the redness around it becomes more extensive (and may even form streaks). If this happens, contact our office.   Some lesions, especially those on the face, may leave a slight palediscoloration.   True scarring, involving deeper layers of the skin is unlikely.

## 2020-11-10 NOTE — PROGRESS NOTES
"Chief Complaint   Patient presents with     Procedure     removal of skin tag on left upper leg       Initial BP (!) 88/60 (BP Location: Left arm, Patient Position: Sitting, Cuff Size: Adult Regular)   Pulse 80   Temp 98.3  F (36.8  C) (Tympanic)   Resp 16   Wt 52.2 kg (115 lb)   SpO2 100%   BMI 19.74 kg/m   Estimated body mass index is 19.74 kg/m  as calculated from the following:    Height as of 11/22/19: 1.626 m (5' 4\").    Weight as of this encounter: 52.2 kg (115 lb).  Medication Reconciliation: complete    Madison Rowe LPN    "

## 2020-11-10 NOTE — PROGRESS NOTES
Patient with concerns about a lesion on her left thigh. It has been there for a while but recently has gotten larger and sore at times. It itches at times. There has been no previous treatment.   BP (!) 88/60 (BP Location: Left arm, Patient Position: Sitting, Cuff Size: Adult Regular)   Pulse 80   Temp 98.3  F (36.8  C) (Tympanic)   Resp 16   Wt 52.2 kg (115 lb)   SpO2 100%   BMI 19.74 kg/m    General: no acute distress  Skin: 4 mm pink, brown papule left inner thigh with superficial irritation  A: skin tag with inflammation  Plan:  We discussed cryo therapy of the lesion. We specifically discussed the risks of infection, discoloration and the possible need for further treatments. The patient expressed understanding and the patient wishes to proceed.  Procedure:  The area of the skin lesion on the left thigh was treated with liquid nitrogen for 2 freeze thaw cycles. The patient tolerated the procedure with no immediately apparent complications. We reviewed discharge instructions. The patient will call for any concerns. The patient will follow up for a recheck of the area if there isn't complete resolution. The patient denies questions at this time.

## 2021-01-04 ENCOUNTER — MYC MEDICAL ADVICE (OUTPATIENT)
Dept: OBGYN | Facility: OTHER | Age: 31
End: 2021-01-04

## 2021-01-04 DIAGNOSIS — Z30.015 ENCOUNTER FOR INITIAL PRESCRIPTION OF VAGINAL RING HORMONAL CONTRACEPTIVE: Primary | ICD-10-CM

## 2021-01-05 RX ORDER — ETONOGESTREL AND ETHINYL ESTRADIOL VAGINAL RING .015; .12 MG/D; MG/D
1 RING VAGINAL
Qty: 3 EACH | Refills: 3 | Status: SHIPPED | OUTPATIENT
Start: 2021-01-05 | End: 2022-09-28

## 2021-04-25 ENCOUNTER — HEALTH MAINTENANCE LETTER (OUTPATIENT)
Age: 31
End: 2021-04-25

## 2021-07-14 ENCOUNTER — OFFICE VISIT (OUTPATIENT)
Dept: OBGYN | Facility: OTHER | Age: 31
End: 2021-07-14
Attending: OBSTETRICS & GYNECOLOGY
Payer: COMMERCIAL

## 2021-07-14 VITALS
BODY MASS INDEX: 20.25 KG/M2 | HEART RATE: 60 BPM | WEIGHT: 118 LBS | SYSTOLIC BLOOD PRESSURE: 102 MMHG | DIASTOLIC BLOOD PRESSURE: 60 MMHG

## 2021-07-14 DIAGNOSIS — Z12.4 CERVICAL CANCER SCREENING: Primary | ICD-10-CM

## 2021-07-14 ASSESSMENT — PAIN SCALES - GENERAL: PAINLEVEL: NO PAIN (0)

## 2021-07-14 NOTE — NURSING NOTE
No charge Provider had to leave for stat delivery patient rescheduled.   Marilyn Clemons LPN........................7/14/2021  4:25 PM

## 2021-07-14 NOTE — NURSING NOTE
FOOD SECURITY SCREENING QUESTIONS  Hunger Vital Signs:  Within the past 12 months we worried whether our food would run out before we got money to buy more. Never  Within the past 12 months the food we bought just didn't last and we didn't have money to get more. Never  Marilyn Clemons LPN 7/14/2021 3:50 PM    Offers no complaints  Marilyn Clemons LPN........................7/14/2021  3:51 PM      Chief Complaint   Patient presents with     Physical     Physical        Medication Reconciliation: completed   Marilyn Clemons LPN  7/14/2021 3:50 PM

## 2021-07-14 NOTE — PROGRESS NOTES
Provider had to leave for stat  patient was rescheduled  Marilyn Clemons LPN........................2021  4:26 PM

## 2021-07-16 ENCOUNTER — OFFICE VISIT (OUTPATIENT)
Dept: OBGYN | Facility: OTHER | Age: 31
End: 2021-07-16
Attending: OBSTETRICS & GYNECOLOGY
Payer: COMMERCIAL

## 2021-07-16 VITALS
HEART RATE: 60 BPM | WEIGHT: 118 LBS | SYSTOLIC BLOOD PRESSURE: 102 MMHG | DIASTOLIC BLOOD PRESSURE: 60 MMHG | BODY MASS INDEX: 20.25 KG/M2

## 2021-07-16 DIAGNOSIS — Z12.4 CERVICAL CANCER SCREENING: ICD-10-CM

## 2021-07-16 DIAGNOSIS — Z00.00 ANNUAL PHYSICAL EXAM: Primary | ICD-10-CM

## 2021-07-16 DIAGNOSIS — F41.8 POSTPARTUM ANXIETY: ICD-10-CM

## 2021-07-16 PROCEDURE — 87624 HPV HI-RISK TYP POOLED RSLT: CPT | Mod: ZL | Performed by: OBSTETRICS & GYNECOLOGY

## 2021-07-16 PROCEDURE — 99395 PREV VISIT EST AGE 18-39: CPT | Performed by: OBSTETRICS & GYNECOLOGY

## 2021-07-16 PROCEDURE — 88142 CYTOPATH C/V THIN LAYER: CPT | Performed by: OBSTETRICS & GYNECOLOGY

## 2021-07-16 RX ORDER — CITALOPRAM HYDROBROMIDE 10 MG/1
10 TABLET ORAL DAILY
Qty: 30 TABLET | Refills: 1 | Status: SHIPPED | OUTPATIENT
Start: 2021-07-16 | End: 2021-08-17

## 2021-07-16 ASSESSMENT — PAIN SCALES - GENERAL: PAINLEVEL: NO PAIN (0)

## 2021-07-16 NOTE — NURSING NOTE
Please nurse visit vitals and notes from 7/14.     Chief Complaint   Patient presents with     Physical       Medication Reconciliation: completed   Marilyn Clemons LPN  7/16/2021 1:52 PM

## 2021-07-16 NOTE — LETTER
July 23, 2021      Romelia Bhandari  [address] [place] [address]    Dear ,      I am happy to inform you that your recent cervical cancer screening test (PAP smear) and HPV testing were normal.      Preventative screenings such as this help to ensure your health for years to come. You should repeat a pap smear in 5 years, unless otherwise directed.      You will still need to return to the clinic every year for your annual exam and other preventive tests.         Sincerely,      Shirley Pardo MD

## 2021-07-20 LAB
BKR LAB AP GYN ADEQUACY: NORMAL
BKR LAB AP GYN INTERPRETATION: NORMAL
BKR LAB AP HPV REFLEX: NORMAL
BKR LAB AP PREVIOUS ABNORMAL: NORMAL
PATH REPORT.RELEVANT HX SPEC: NORMAL

## 2021-07-23 LAB
HUMAN PAPILLOMA VIRUS 16 DNA: NEGATIVE
HUMAN PAPILLOMA VIRUS 18 DNA: NEGATIVE
HUMAN PAPILLOMA VIRUS FINAL DIAGNOSIS: NORMAL
HUMAN PAPILLOMA VIRUS OTHER HR: NEGATIVE

## 2021-08-17 ENCOUNTER — OFFICE VISIT (OUTPATIENT)
Dept: OBGYN | Facility: OTHER | Age: 31
End: 2021-08-17
Attending: OBSTETRICS & GYNECOLOGY
Payer: COMMERCIAL

## 2021-08-17 VITALS
DIASTOLIC BLOOD PRESSURE: 60 MMHG | SYSTOLIC BLOOD PRESSURE: 108 MMHG | BODY MASS INDEX: 20.08 KG/M2 | HEART RATE: 66 BPM | WEIGHT: 117 LBS

## 2021-08-17 DIAGNOSIS — F41.8 POSTPARTUM ANXIETY: Primary | ICD-10-CM

## 2021-08-17 PROCEDURE — 99213 OFFICE O/P EST LOW 20 MIN: CPT | Performed by: OBSTETRICS & GYNECOLOGY

## 2021-08-17 RX ORDER — CITALOPRAM HYDROBROMIDE 10 MG/1
10 TABLET ORAL DAILY
Qty: 90 TABLET | Refills: 3 | Status: SHIPPED | OUTPATIENT
Start: 2021-08-17 | End: 2022-08-18

## 2021-08-17 ASSESSMENT — PAIN SCALES - GENERAL: PAINLEVEL: NO PAIN (0)

## 2021-08-17 NOTE — NURSING NOTE
FOOD SECURITY SCREENING QUESTIONS  Hunger Vital Signs:  Within the past 12 months we worried whether our food would run out before we got money to buy more. Never  Within the past 12 months the food we bought just didn't last and we didn't have money to get more. Never  Marilyn Clemons LPN 8/17/2021 3:45 PM    Chief Complaint   Patient presents with     RECHECK     Mood        Medication Reconciliation: completed   Marilyn Clemons LPN  8/17/2021 3:45 PM

## 2021-08-17 NOTE — PROGRESS NOTES
Follow-Up Visit    S: Ms. Romelia Bhandari is a 31 year old  here for mood follow up. She was last seen on  to discuss postpartum depression and anxiety. She was started on celexa. Since then, she reports her mood is much better. She feels like she is handling stress better, is more patient with her children, and more in control of her emotions. She is happy with her decision to start the medication.    O:  /60 (BP Location: Right arm, Patient Position: Sitting, Cuff Size: Adult Regular)   Pulse 66   Wt 53.1 kg (117 lb)   LMP 2021 (Exact Date)   Breastfeeding No   BMI 20.08 kg/m    Gen: Well-appearing, NAD  Pulm: nonlabored  Psych: appropriate mood and affect    A/P:  Ms. Romelia Bhandari is a 31 year old  here for postpartum depression, anxiety follow up. Doing well. Will continue at current dose of celexa but will call if she feels like she needs a higher dose.   - RTC annually    Shirley Pardo MD  OB/GYN  2021 3:52 PM

## 2021-10-09 ENCOUNTER — HEALTH MAINTENANCE LETTER (OUTPATIENT)
Age: 31
End: 2021-10-09

## 2021-10-19 ENCOUNTER — ALLIED HEALTH/NURSE VISIT (OUTPATIENT)
Dept: FAMILY MEDICINE | Facility: OTHER | Age: 31
End: 2021-10-19
Attending: FAMILY MEDICINE

## 2021-10-19 DIAGNOSIS — Z23 NEED FOR VACCINATION: Primary | ICD-10-CM

## 2021-10-19 PROCEDURE — 91300 PR COVID VAC PFIZER DIL RECON 30 MCG/0.3 ML IM: CPT

## 2021-10-19 PROCEDURE — 0004A PR COVID VAC PFIZER DIL RECON 30 MCG/0.3 ML IM: CPT

## 2021-11-05 ENCOUNTER — ALLIED HEALTH/NURSE VISIT (OUTPATIENT)
Dept: OBGYN | Facility: OTHER | Age: 31
End: 2021-11-05
Attending: OBSTETRICS & GYNECOLOGY
Payer: COMMERCIAL

## 2021-11-05 ENCOUNTER — TELEPHONE (OUTPATIENT)
Dept: OBGYN | Facility: OTHER | Age: 31
End: 2021-11-05

## 2021-11-05 DIAGNOSIS — Z32.00 ENCOUNTER FOR PREGNANCY TEST, RESULT UNKNOWN: Primary | ICD-10-CM

## 2021-11-05 LAB — HCG UR QL: NEGATIVE

## 2021-11-05 PROCEDURE — 99207 PR NO CHARGE NURSE ONLY: CPT

## 2021-11-05 PROCEDURE — 81025 URINE PREGNANCY TEST: CPT | Mod: ZL

## 2021-11-05 NOTE — PROGRESS NOTES
Pt presents for pregnancy test confirmation. Her cycle has been late. She forgot her nuvaring one day.  She tested negative today. Discussed retesting on 11/7 and replacing the nuvaring at that time.       Pascale Munoz RN on 11/5/2021 at 3:12 PM

## 2021-11-17 ENCOUNTER — MYC MEDICAL ADVICE (OUTPATIENT)
Dept: OBGYN | Facility: OTHER | Age: 31
End: 2021-11-17
Payer: COMMERCIAL

## 2021-11-17 DIAGNOSIS — Z30.015 ENCOUNTER FOR INITIAL PRESCRIPTION OF VAGINAL RING HORMONAL CONTRACEPTIVE: Primary | ICD-10-CM

## 2021-11-18 RX ORDER — SEGESTERONE ACETATE AND ETHINYL ESTRADIOL 103; 17.4 MG/1; MG/1
1 RING VAGINAL DAILY
Qty: 1 EACH | Refills: 0 | Status: SHIPPED | OUTPATIENT
Start: 2021-11-18 | End: 2022-08-18

## 2022-03-03 ENCOUNTER — LAB REQUISITION (OUTPATIENT)
Dept: LAB | Facility: OTHER | Age: 32
End: 2022-03-03

## 2022-03-03 DIAGNOSIS — Z11.52 ENCOUNTER FOR SCREENING FOR COVID-19: ICD-10-CM

## 2022-03-03 LAB
FLUAV RNA SPEC QL NAA+PROBE: NEGATIVE
FLUBV RNA RESP QL NAA+PROBE: NEGATIVE
RSV RNA SPEC NAA+PROBE: NEGATIVE
SARS-COV-2 RNA RESP QL NAA+PROBE: NEGATIVE

## 2022-03-03 PROCEDURE — 87637 SARSCOV2&INF A&B&RSV AMP PRB: CPT | Performed by: FAMILY MEDICINE

## 2022-05-11 ENCOUNTER — LAB (OUTPATIENT)
Dept: LAB | Facility: OTHER | Age: 32
End: 2022-05-11
Attending: OBSTETRICS & GYNECOLOGY
Payer: COMMERCIAL

## 2022-05-11 DIAGNOSIS — R23.2 HOT FLASHES: ICD-10-CM

## 2022-05-11 LAB
ESTRADIOL SERPL-MCNC: <20 PG/ML
FSH SERPL-ACNC: 5.3 PG/ML
LH SERPL-ACNC: 1.7 IU/L
TSH SERPL DL<=0.005 MIU/L-ACNC: 1.05 MU/L (ref 0.4–4)

## 2022-05-11 PROCEDURE — 82670 ASSAY OF TOTAL ESTRADIOL: CPT | Mod: ZL

## 2022-05-11 PROCEDURE — 36415 COLL VENOUS BLD VENIPUNCTURE: CPT | Mod: ZL

## 2022-05-11 PROCEDURE — 83001 ASSAY OF GONADOTROPIN (FSH): CPT | Mod: ZL

## 2022-05-11 PROCEDURE — 83002 ASSAY OF GONADOTROPIN (LH): CPT | Mod: ZL

## 2022-05-11 PROCEDURE — 84443 ASSAY THYROID STIM HORMONE: CPT | Mod: ZL

## 2022-06-02 ENCOUNTER — LAB REQUISITION (OUTPATIENT)
Dept: LAB | Facility: OTHER | Age: 32
End: 2022-06-02

## 2022-06-02 DIAGNOSIS — Z11.52 ENCOUNTER FOR SCREENING FOR COVID-19: ICD-10-CM

## 2022-06-02 PROCEDURE — 87637 SARSCOV2&INF A&B&RSV AMP PRB: CPT | Performed by: FAMILY MEDICINE

## 2022-08-18 ENCOUNTER — OFFICE VISIT (OUTPATIENT)
Dept: OBGYN | Facility: OTHER | Age: 32
End: 2022-08-18
Attending: OBSTETRICS & GYNECOLOGY
Payer: COMMERCIAL

## 2022-08-18 VITALS
DIASTOLIC BLOOD PRESSURE: 62 MMHG | SYSTOLIC BLOOD PRESSURE: 104 MMHG | OXYGEN SATURATION: 100 % | BODY MASS INDEX: 20.72 KG/M2 | WEIGHT: 120.7 LBS | HEART RATE: 70 BPM

## 2022-08-18 DIAGNOSIS — Z30.015 ENCOUNTER FOR INITIAL PRESCRIPTION OF VAGINAL RING HORMONAL CONTRACEPTIVE: ICD-10-CM

## 2022-08-18 DIAGNOSIS — Z00.00 ANNUAL PHYSICAL EXAM: Primary | ICD-10-CM

## 2022-08-18 DIAGNOSIS — F41.8 POSTPARTUM ANXIETY: ICD-10-CM

## 2022-08-18 PROBLEM — O36.8990 FETAL DISTRESS AFFECTING CARE: Status: RESOLVED | Noted: 2018-05-18 | Resolved: 2022-08-18

## 2022-08-18 PROBLEM — O47.00 PRETERM UTERINE CONTRACTIONS: Status: ACTIVE | Noted: 2018-04-24

## 2022-08-18 PROBLEM — R10.30 LOWER ABDOMINAL PAIN: Status: ACTIVE | Noted: 2018-04-24

## 2022-08-18 PROCEDURE — 99395 PREV VISIT EST AGE 18-39: CPT | Performed by: OBSTETRICS & GYNECOLOGY

## 2022-08-18 RX ORDER — CITALOPRAM HYDROBROMIDE 10 MG/1
10 TABLET ORAL DAILY
Qty: 90 TABLET | Refills: 3 | Status: SHIPPED | OUTPATIENT
Start: 2022-08-18 | End: 2023-09-22

## 2022-08-18 RX ORDER — SEGESTERONE ACETATE AND ETHINYL ESTRADIOL 103; 17.4 MG/1; MG/1
1 RING VAGINAL DAILY
Qty: 1 EACH | Refills: 0 | Status: SHIPPED | OUTPATIENT
Start: 2022-08-18 | End: 2023-09-22

## 2022-08-18 ASSESSMENT — PAIN SCALES - GENERAL: PAINLEVEL: NO PAIN (0)

## 2022-08-18 NOTE — NURSING NOTE
"  Chief Complaint   Patient presents with     Physical     Annual exam        Initial /62 (BP Location: Right arm, Patient Position: Sitting, Cuff Size: Adult Regular)   Pulse 70   Wt 54.7 kg (120 lb 11.2 oz)   LMP 07/27/2022   SpO2 100%   Breastfeeding No   BMI 20.72 kg/m   Estimated body mass index is 20.72 kg/m  as calculated from the following:    Height as of 11/22/19: 1.626 m (5' 4\").    Weight as of this encounter: 54.7 kg (120 lb 11.2 oz).  Medication Reconciliation: complete    Pascale Munoz RN    "

## 2022-08-18 NOTE — PROGRESS NOTES
Annual Exam    HPI:    Romelia Bhandari is a 32 year old , here for well woman exam.  No big concerns. Likes using Annovera for contraception. Periods are intermittent and variable flow.  plans for vasectomy in the spring and then she would like to stop all hormones to see if that helps make her feel more stable. Had a lot of hot flashes, night sweats in the early summer. Has been better lately.       OBHx  OB History    Para Term  AB Living   2 2 2 0 0 2   SAB IAB Ectopic Multiple Live Births   0 0 0 0 2      # Outcome Date GA Lbr Dewey/2nd Weight Sex Delivery Anes PTL Lv   2 Term 20 39w1d  2.954 kg (6 lb 8.2 oz) F Vag-Spont INT N JONH      Name: GIANNAFEMALE-ROMELIA      Apgar1: 7  Apgar5: 9   1 Term 18 38w0d 06:45 / 00:38 2.761 kg (6 lb 1.4 oz) F Vag-Vacuum EPI N JONH      Birth Comments: head molding and bruising      Complications: Fetal Intolerance      Name: GIANNABABY1 ROMELIA      Apgar1: 7  Apgar5: 8       PMHx:   Past Medical History:   Diagnosis Date     H/O abnormal cervical Papanicolaou smear      H/O LEEP       PSHx:   Past Surgical History:   Procedure Laterality Date     EXTRACTION(S) DENTAL      age 16,no anesthetic problems      Meds:   Current Outpatient Medications   Medication     citalopram (CELEXA) 10 MG tablet     Segesterone-Ethinyl Estradiol (ANNOVERA) 0.15-0.013 MG/24HR RING     acetaminophen (TYLENOL) 325 MG tablet     albuterol (PROAIR HFA/PROVENTIL HFA/VENTOLIN HFA) 108 (90 BASE) MCG/ACT Inhaler     etonogestrel-ethinyl estradiol (NUVARING) 0.12-0.015 MG/24HR vaginal ring     ibuprofen (ADVIL/MOTRIN) 800 MG tablet     No current facility-administered medications for this visit.       Allergies:       Allergies   Allergen Reactions     Penicillin G Rash     Penicillins Rash       SocHx:   Social History     Tobacco Use     Smoking status: Never Smoker     Smokeless tobacco: Never Used   Vaping Use     Vaping Use: Never used   Substance Use  Topics     Alcohol use: No     Drug use: Never     Comment: Drug use: No     , lives with her  and their daughters. Works at [employer]    FamHx:   Family History   Problem Relation Age of Onset     No Known Problems Mother      No Known Problems Father      Cerebrovascular Disease Paternal Grandfather         stroke     No Known Problems Daughter      No Known Problems Daughter         ROS: 10-Point ROS negative except as noted in HPI      Physical Exam  /62 (BP Location: Right arm, Patient Position: Sitting, Cuff Size: Adult Regular)   Pulse 70   Wt 54.7 kg (120 lb 11.2 oz)   LMP 2022   SpO2 100%   Breastfeeding No   BMI 20.72 kg/m    Gen: Well-appearing, NAD  HEENT: Normocephalic, atraumatic  Neck: Thyroid is not enlarged, no appreciable masses palpated. Non-tender  CV:  RRR, no m/r/g auscultated  Pulm: CTAB, no w/r/r auscultated  Abd: Soft, non-tender, non-distended  Ext: No LE edema, extremities warm and well perfused    Breast: Symmetric, no nipple discharge or retraction, no axillary lymphadenopathy, no palpable nodules or masses bilaterally. Dense breast tissue in  Upper outer quadrants bilaterally        --Ideal BMI: 18.5-24.9  Current BMI: Body mass index is 20.72 kg/m .  --Underweight = <18.5  --Normal weight = 18.5-24.9  --Overweight = 25-29.9  --Obesity = >30    Assessment/Plan  Romelia Bhandari is a 32 year old  female here for annual exam.     1. Routine Health Maintenance:   - vaccinations up to date    2. Cervical cancer screening:  - Hx of LEEP in , normal paps since. Last 2021. Next due 2026    3. Contraception  - Refill Annovera.     4. Hot flashes, night sweats  - suspect low estradiol due to contraception. If periods irregular after stopping contraception, recommend return for evaluation    5. Depression, anxiety  - continue celexa    Follow Up: annually    Shirley Pardo MD  OB/GYN  2022 11:51 AM

## 2022-08-23 ENCOUNTER — LAB REQUISITION (OUTPATIENT)
Dept: LAB | Facility: OTHER | Age: 32
End: 2022-08-23

## 2022-08-23 DIAGNOSIS — Z11.52 ENCOUNTER FOR SCREENING FOR COVID-19: ICD-10-CM

## 2022-08-23 LAB
FLUAV RNA SPEC QL NAA+PROBE: NEGATIVE
FLUBV RNA RESP QL NAA+PROBE: NEGATIVE
RSV RNA SPEC NAA+PROBE: NEGATIVE
SARS-COV-2 RNA RESP QL NAA+PROBE: POSITIVE

## 2022-08-23 PROCEDURE — 87637 SARSCOV2&INF A&B&RSV AMP PRB: CPT | Performed by: FAMILY MEDICINE

## 2022-09-07 ENCOUNTER — TELEPHONE (OUTPATIENT)
Dept: OBGYN | Facility: OTHER | Age: 32
End: 2022-09-07

## 2022-09-07 NOTE — TELEPHONE ENCOUNTER
Call placed to patient to discuss that Annovera is not covered by her insurance. Offered alternative and patient states she will call when she makes a decision. Her current ring is good until December.    Shirlene Domínguez RN...................9/7/2022 4:15 PM

## 2022-09-17 ENCOUNTER — HEALTH MAINTENANCE LETTER (OUTPATIENT)
Age: 32
End: 2022-09-17

## 2022-09-28 ENCOUNTER — MYC MEDICAL ADVICE (OUTPATIENT)
Dept: OBGYN | Facility: OTHER | Age: 32
End: 2022-09-28

## 2022-09-28 DIAGNOSIS — Z30.015 ENCOUNTER FOR INITIAL PRESCRIPTION OF VAGINAL RING HORMONAL CONTRACEPTIVE: ICD-10-CM

## 2022-09-28 RX ORDER — ETONOGESTREL AND ETHINYL ESTRADIOL VAGINAL RING .015; .12 MG/D; MG/D
1 RING VAGINAL
Qty: 3 EACH | Refills: 1 | Status: SHIPPED | OUTPATIENT
Start: 2022-09-28 | End: 2023-09-22

## 2023-07-20 ENCOUNTER — HOSPITAL ENCOUNTER (OUTPATIENT)
Dept: GENERAL RADIOLOGY | Facility: OTHER | Age: 33
Discharge: HOME | End: 2023-07-20
Payer: COMMERCIAL

## 2023-07-20 ENCOUNTER — OFFICE VISIT (OUTPATIENT)
Dept: OBGYN | Facility: OTHER | Age: 33
End: 2023-07-20
Payer: COMMERCIAL

## 2023-07-20 VITALS
DIASTOLIC BLOOD PRESSURE: 76 MMHG | SYSTOLIC BLOOD PRESSURE: 104 MMHG | WEIGHT: 121.3 LBS | HEART RATE: 82 BPM | BODY MASS INDEX: 20.82 KG/M2

## 2023-07-20 DIAGNOSIS — R50.9 FEVER, UNSPECIFIED FEVER CAUSE: ICD-10-CM

## 2023-07-20 DIAGNOSIS — R52 GENERALIZED BODY ACHES: ICD-10-CM

## 2023-07-20 DIAGNOSIS — R05.8 SPUTUM PRODUCTION: ICD-10-CM

## 2023-07-20 DIAGNOSIS — R05.1 ACUTE COUGH: Primary | ICD-10-CM

## 2023-07-20 DIAGNOSIS — R06.02 SHORTNESS OF BREATH: ICD-10-CM

## 2023-07-20 DIAGNOSIS — J18.9 PNEUMONIA OF LEFT LOWER LOBE DUE TO INFECTIOUS ORGANISM: ICD-10-CM

## 2023-07-20 DIAGNOSIS — R05.1 ACUTE COUGH: ICD-10-CM

## 2023-07-20 LAB — L PNEUMO1 AG UR QL IA: NEGATIVE

## 2023-07-20 PROCEDURE — 87899 AGENT NOS ASSAY W/OPTIC: CPT | Mod: ZL

## 2023-07-20 PROCEDURE — 71046 X-RAY EXAM CHEST 2 VIEWS: CPT

## 2023-07-20 PROCEDURE — 99214 OFFICE O/P EST MOD 30 MIN: CPT

## 2023-07-20 RX ORDER — AZITHROMYCIN 250 MG/1
TABLET, FILM COATED ORAL
Qty: 6 TABLET | Refills: 0 | Status: SHIPPED | OUTPATIENT
Start: 2023-07-20 | End: 2023-07-25

## 2023-07-20 RX ORDER — CEFPODOXIME PROXETIL 200 MG/1
200 TABLET, FILM COATED ORAL 2 TIMES DAILY
Qty: 10 TABLET | Refills: 0 | Status: SHIPPED | OUTPATIENT
Start: 2023-07-20 | End: 2023-07-25

## 2023-07-20 ASSESSMENT — PAIN SCALES - GENERAL: PAINLEVEL: MILD PAIN (2)

## 2023-07-20 NOTE — PROGRESS NOTES
Assessment & Plan     Acute cough  Shortness of breath  Sputum production  Fever, unspecified fever cause  Generalized body aches  - XR Chest 2 Views; Future  - Legionella pneumophila antigen urine; Future  - Legionella pneumophila antigen urine    Due to the above we will perform chest x-ray as well as Legionella testing due to recent discovery of Legionella within the [place] area.  We will notify patient of findings.  We did discuss symptomatic treatment at home.  COVID swabbing is not done as patient was negative x2 at home.    Pneumonia of left lower lobe due to infectious organism  Patient noted to have some subtle left lower lobe pneumonia based on clinical exam as well as x-ray today.  We will treat with the below.  Currently waiting for Legionella testing to come back.  - azithromycin (ZITHROMAX) 250 MG tablet; Take 2 tablets (500 mg) by mouth daily for 1 day, THEN 1 tablet (250 mg) daily for 4 days.  - cefpodoxime (VANTIN) 200 MG tablet; Take 1 tablet (200 mg) by mouth 2 times daily for 5 days  [id number]}  JAMEEL Corrales CNP  [facility]    Alesha León is a 33 year old, presenting for the following health issues:  Consult (Legionnaries disease? )   She notes that she began having the symptoms 1.5 weeks ago as below.  She is wondering if she possibly has legionnaires disease or some other respiratory illness.  Notes [place]    HPI     Acute Illness  Acute illness concerns: Patient reports cough, shortness of breath, sputum production, fevers, chills, headache.  Onset/Duration: 1.5 weeks agoYES  Symptoms:  Fever: YES  Chills/Sweats: YES  Headache (location?): YES  Sinus Pressure: No  Conjunctivitis:  No  Ear Pain: no  Rhinorrhea: No  Congestion: YES  Sore Throat: No  Cough: YES-productive of yellow sputum  Wheeze: No  Fatigue/Achiness: YES  Sick/Strep Exposure: No home exposures.  No one else around her sick.  Therapies  tried and outcome: Symptomatic control        Review of Systems   As per HPI      Objective    /76   Pulse 82   Wt 55 kg (121 lb 4.8 oz)   LMP 06/22/2023   BMI 20.82 kg/m    Body mass index is 20.82 kg/m .  Physical Exam   GENERAL: healthy, alert and no distress  EYES: Eyes grossly normal to inspection, PERRL and conjunctivae and sclerae normal  RESP: rhonchi L lower posterior  CV: regular rate and rhythm, normal S1 S2, no S3 or S4, no murmur, click or rub, no peripheral edema and peripheral pulses strong  MS: no gross musculoskeletal defects noted, no edema  NEURO: Normal strength and tone, mentation intact and speech normal    Xray - Reviewed and interpreted by me.  Opacity is noted in the left lower lobe.  Waiting official read

## 2023-07-20 NOTE — NURSING NOTE
Patient presents to clinic for concerns of possible Legionnaries disease  /76   Pulse 82   Wt 55 kg (121 lb 4.8 oz)   LMP 06/22/2023   BMI 20.82 kg/m    Yolis Kelley LPN on 7/20/2023 at 10:09 AM

## 2023-07-25 ENCOUNTER — MYC REFILL (OUTPATIENT)
Dept: OBGYN | Facility: OTHER | Age: 33
End: 2023-07-25
Payer: COMMERCIAL

## 2023-07-25 DIAGNOSIS — J18.9 PNEUMONIA OF LEFT LOWER LOBE DUE TO INFECTIOUS ORGANISM: ICD-10-CM

## 2023-07-25 RX ORDER — AZITHROMYCIN 250 MG/1
TABLET, FILM COATED ORAL
Qty: 6 TABLET | Refills: 0 | OUTPATIENT
Start: 2023-07-25 | End: 2023-07-30

## 2023-07-25 RX ORDER — CEFPODOXIME PROXETIL 200 MG/1
200 TABLET, FILM COATED ORAL 2 TIMES DAILY
Qty: 10 TABLET | Refills: 0 | OUTPATIENT
Start: 2023-07-25

## 2023-07-25 NOTE — TELEPHONE ENCOUNTER
Patient requested  Rx request for the following:      Requested Prescriptions   Pending Prescriptions Disp Refills    azithromycin (ZITHROMAX) 250 MG tablet 6 tablet 0     Sig: Take 2 tablets (500 mg) by mouth daily for 1 day, THEN 1 tablet (250 mg) daily for 4 days.       There is no refill protocol information for this order     Last Prescription Date:   07/20/23 END 07/25/23  Last Fill Qty/Refills:         6, R-0      cefpodoxime (VANTIN) 200 MG tablet 10 tablet 0     Sig: Take 1 tablet (200 mg) by mouth 2 times daily       There is no refill protocol information for this order        Last Prescription Date:   07/20/23 END 07/25/23  Last Fill Qty/Refills:         10, R-0  Last Office Visit:              07/20/23   Future Office visit:           None    Mary Vega RN on 7/25/2023 at 4:11 PM

## 2023-09-22 ENCOUNTER — OFFICE VISIT (OUTPATIENT)
Dept: OBGYN | Facility: OTHER | Age: 33
End: 2023-09-22
Attending: OBSTETRICS & GYNECOLOGY
Payer: COMMERCIAL

## 2023-09-22 VITALS
BODY MASS INDEX: 20.6 KG/M2 | WEIGHT: 120 LBS | DIASTOLIC BLOOD PRESSURE: 66 MMHG | SYSTOLIC BLOOD PRESSURE: 106 MMHG | HEART RATE: 68 BPM

## 2023-09-22 DIAGNOSIS — Z00.00 ANNUAL PHYSICAL EXAM: Primary | ICD-10-CM

## 2023-09-22 PROCEDURE — 99395 PREV VISIT EST AGE 18-39: CPT | Performed by: OBSTETRICS & GYNECOLOGY

## 2023-09-22 RX ORDER — ETONOGESTREL AND ETHINYL ESTRADIOL VAGINAL RING .015; .12 MG/D; MG/D
1 RING VAGINAL
Qty: 3 EACH | Refills: 1 | Status: CANCELLED | OUTPATIENT
Start: 2023-09-22

## 2023-09-22 RX ORDER — CITALOPRAM HYDROBROMIDE 10 MG/1
10 TABLET ORAL DAILY
Qty: 90 TABLET | Refills: 3 | Status: CANCELLED | OUTPATIENT
Start: 2023-09-22

## 2023-09-22 ASSESSMENT — PAIN SCALES - GENERAL: PAINLEVEL: NO PAIN (0)

## 2023-09-22 NOTE — NURSING NOTE
Chief Complaint   Patient presents with    Physical       Medication Reconciliation: complete      Marilyn Clemons LPN........................9/22/2023  1:48 PM

## 2023-09-22 NOTE — PROGRESS NOTES
Annual Exam    HPI:    Romelia Bhandari is a 33 year old , here for well woman exam.  Stopped NuvaRing in April after her 's vasectomy follow up was completed. Since stopping, she has been getting pelvic pain around the time of ovulation but it has been getting better every month. Periods are regular, normal flow. Stopped celexa in  and reports her mood has been good.       OBHx  OB History    Para Term  AB Living   2 2 2 0 0 2   SAB IAB Ectopic Multiple Live Births   0 0 0 0 2      # Outcome Date GA Lbr Dewey/2nd Weight Sex Delivery Anes PTL Lv   2 Term 20 39w1d  2.954 kg (6 lb 8.2 oz) F Vag-Spont INT N JONH      Name: GIANNAFEMALE-ROMELIA      Apgar1: 7  Apgar5: 9   1 Term 18 38w0d 06:45 / 00:38 2.761 kg (6 lb 1.4 oz) F Vag-Vacuum EPI N JONH      Birth Comments: head molding and bruising      Complications: Fetal Intolerance      Name: GIANNABABY1 ROMELIA      Apgar1: 7  Apgar5: 8       PMHx:   Past Medical History:   Diagnosis Date    H/O abnormal cervical Papanicolaou smear     H/O LEEP       PSHx:   Past Surgical History:   Procedure Laterality Date    EXTRACTION(S) DENTAL      age 16,no anesthetic problems      Meds:   Current Outpatient Medications   Medication    albuterol (PROAIR HFA/PROVENTIL HFA/VENTOLIN HFA) 108 (90 BASE) MCG/ACT Inhaler     No current facility-administered medications for this visit.      Allergies:     Allergies   Allergen Reactions    Penicillin G Rash    Penicillins Rash       SocHx:   Social History     Tobacco Use    Smoking status: Never    Smokeless tobacco: Never   Vaping Use    Vaping Use: Never used   Substance Use Topics    Alcohol use: No    Drug use: Never     Comment: Drug use: No      , lives with her  and 2 daughters. Works at [employer]    FamHx:   Family History   Problem Relation Age of Onset    No Known Problems Mother     No Known Problems Father     Cerebrovascular Disease Paternal Grandfather         stroke     No Known Problems Daughter     No Known Problems Daughter         ROS: 10-Point ROS negative except as noted in HPI    Preventative Health  Current or historical sexual, physical or mental abuse: no  Feelings of depression: no  Seat belt usage: yes  Diet: good  Exercise: yes    Physical Exam  /66 (BP Location: Right arm, Patient Position: Sitting, Cuff Size: Adult Regular)   Pulse 68   Wt 54.4 kg (120 lb)   LMP 2023 (Exact Date)   BMI 20.60 kg/m    Gen: Well-appearing, NAD  HEENT: Normocephalic, atraumatic  Neck: Thyroid is not enlarged, no appreciable masses palpated. Non-tender  CV:  RRR, no m/r/g auscultated  Pulm: CTAB, no w/r/r auscultated  Abd: Soft, non-tender, non-distended  Ext: No LE edema, extremities warm and well perfused    Breast: Symmetric, no nipple discharge or retraction, no axillary lymphadenopathy, no palpable nodules or masses bilaterally    Pelvic:  deferred      --Ideal BMI: 18.5-24.9  Current BMI: Body mass index is 20.6 kg/m .  --Underweight = <18.5  --Normal weight = 18.5-24.9  --Overweight = 25-29.9  --Obesity = >30    Assessment/Plan  Romelia Bhandari is a 33 year old  female here for annual exam.     1. Routine Health Maintenance:   - up to date. Flu vaccine through work.    2. Cervical cancer screening:  - History of LEEP  with normal follow up, now in routine screening.   - next pap 2026    Follow Up: annually or sooner anayeli Pardo MD  OB/GYN  2023 1:56 PM

## 2024-09-17 ENCOUNTER — OFFICE VISIT (OUTPATIENT)
Dept: OBGYN | Facility: OTHER | Age: 34
End: 2024-09-17
Attending: NURSE PRACTITIONER
Payer: COMMERCIAL

## 2024-09-17 VITALS
SYSTOLIC BLOOD PRESSURE: 100 MMHG | WEIGHT: 125 LBS | BODY MASS INDEX: 21.46 KG/M2 | DIASTOLIC BLOOD PRESSURE: 60 MMHG | HEART RATE: 84 BPM

## 2024-09-17 DIAGNOSIS — R30.0 DYSURIA: Primary | ICD-10-CM

## 2024-09-17 DIAGNOSIS — N30.01 ACUTE CYSTITIS WITH HEMATURIA: ICD-10-CM

## 2024-09-17 LAB
ALBUMIN UR-MCNC: NEGATIVE MG/DL
APPEARANCE UR: CLEAR
BACTERIA #/AREA URNS HPF: ABNORMAL /HPF
BILIRUB UR QL STRIP: NEGATIVE
COLOR UR AUTO: YELLOW
GLUCOSE UR STRIP-MCNC: NEGATIVE MG/DL
HGB UR QL STRIP: ABNORMAL
KETONES UR STRIP-MCNC: NEGATIVE MG/DL
LEUKOCYTE ESTERASE UR QL STRIP: ABNORMAL
NITRATE UR QL: NEGATIVE
PH UR STRIP: 7 [PH] (ref 5–9)
RBC URINE: 1 /HPF
SP GR UR STRIP: 1.01 (ref 1–1.03)
SQUAMOUS EPITHELIAL: 2 /HPF
UROBILINOGEN UR STRIP-MCNC: NORMAL MG/DL
WBC URINE: 12 /HPF

## 2024-09-17 PROCEDURE — 81001 URINALYSIS AUTO W/SCOPE: CPT | Mod: ZL | Performed by: NURSE PRACTITIONER

## 2024-09-17 PROCEDURE — 87086 URINE CULTURE/COLONY COUNT: CPT | Mod: ZL | Performed by: NURSE PRACTITIONER

## 2024-09-17 PROCEDURE — 99214 OFFICE O/P EST MOD 30 MIN: CPT | Performed by: NURSE PRACTITIONER

## 2024-09-17 RX ORDER — NITROFURANTOIN 25; 75 MG/1; MG/1
100 CAPSULE ORAL 2 TIMES DAILY
Qty: 10 CAPSULE | Refills: 0 | Status: SHIPPED | OUTPATIENT
Start: 2024-09-17 | End: 2024-09-22

## 2024-09-17 ASSESSMENT — PAIN SCALES - GENERAL: PAINLEVEL: MODERATE PAIN (4)

## 2024-09-17 NOTE — PROGRESS NOTES
Consult    Chief Complaint: UTI symptoms    HPI:    Romelia Bhandari is a 34 year old , here for concern regarding onset of UTI symptoms that started this morning.  Patient is having dysuria, urinary frequency urgency and some lower abdominal discomfort.  She has been trying to stay hydrated with water.  She has been experiencing some diarrhea.  Denies any vaginal discharge changes, vaginal itching or pain with intercourse.  Does not have recent history of recurrent urinary tract infection.    Patient is not on contraception, patient's  has vasectomy.    History of LEEP in  with normal follow-up, now routine screening-next Pap due 2026    No LMP recorded.    OBHx  OB History    Para Term  AB Living   2 2 2 0 0 2   SAB IAB Ectopic Multiple Live Births   0 0 0 0 2      # Outcome Date GA Lbr Dewey/2nd Weight Sex Type Anes PTL Lv   2 Term 20 39w1d  2.954 kg (6 lb 8.2 oz) F Vag-Spont INT N JONH      Name: GIANNAFEMALE-[place]      Apgar1: 7  Apgar5: 9   1 Term 18 38w0d 06:45 / 00:38 2.761 kg (6 lb 1.4 oz) F Vag-Vacuum EPI N JONH      Birth Comments: head molding and bruising      Complications: Fetal Intolerance      Name: GIANNABABY1 ROMELIA      Apgar1: 7  Apgar5: 8     ROS: 10-Point ROS negative except as noted in HPI    Physical Exam  /60   Pulse 84   Wt 56.7 kg (125 lb)   BMI 21.46 kg/m    Gen: Well-appearing, well developed  Resp: nonlabored, normal effort, no audible wheezing  GI: soft, right lower quadrant tenderness, no flank pain  MS: moving without difficulty  Psych: appropriate mood and affect      LABS: The last test results for Ms. Romelia Bhandari were reviewed.   Results for orders placed or performed in visit on 24 (from the past 24 hour(s))   UA with Microscopic reflex to Culture    Specimen: Urine, Midstream   Result Value Ref Range    Color Urine Yellow Colorless, Straw, Light Yellow, Yellow    Appearance Urine Clear Clear     Glucose Urine Negative Negative mg/dL    Bilirubin Urine Negative Negative    Ketones Urine Negative Negative mg/dL    Specific Gravity Urine 1.009 1.000 - 1.030    Blood Urine Small (A) Negative    pH Urine 7.0 5.0 - 9.0    Protein Albumin Urine Negative Negative mg/dL    Urobilinogen Urine Normal Normal, 2.0 mg/dL    Nitrite Urine Negative Negative    Leukocyte Esterase Urine Moderate (A) Negative    Bacteria Urine Few (A) None Seen /HPF    RBC Urine 1 <=2 /HPF    WBC Urine 12 (H) <=5 /HPF    Squamous Epithelials Urine 2 (H) <=1 /HPF    Narrative    Urine Culture ordered based on laboratory criteria       ASSESSMENT/PLAN  Romelia Bhandari is a 34 year old  female here for acute urinary symptoms that started this morning.  Patient is experiencing dysuria, urinary frequency urgency and some suprapubic tenderness.  Vital signs are stable, afebrile.  Abdomen soft with some slight right lower quadrant tenderness.  No CVA tenderness.  Recommend proceeding with urinalysis.    1. Dysuria  - UA with Microscopic reflex to Culture  - Urine Culture    2. Acute cystitis with hematuria  - nitroFURantoin macrocrystal-monohydrate (MACROBID) 100 MG capsule; Take 1 capsule (100 mg) by mouth 2 times daily for 5 days.  Dispense: 10 capsule; Refill: 0   Urinalysis returned showing trace amount of hematuria and leukocyte esterase, negative for nitrates.  Recommend initiating antibiotic treatment for acute cystitis with hematuria.  Prescription for Macrobid twice daily for 5 days sent to pharmacy.  Patient will be notified if antibiotic changes indicated once urine culture results.  She knows to return for reevaluation if symptoms are worsening or not improving.    Follow up: As needed if symptoms are not improving or worsening.    Astrid Adams NP  [facility]    OB/GYN

## 2024-09-17 NOTE — NURSING NOTE
"Chief Complaint   Patient presents with    UTI     Patient is here to rule out UTI, started last night. Patient is having pain with urination, urgency, frequency.   Initial /60   Pulse 84   Wt 56.7 kg (125 lb)   BMI 21.46 kg/m   Estimated body mass index is 21.46 kg/m  as calculated from the following:    Height as of 11/22/19: 1.626 m (5' 4\").    Weight as of this encounter: 56.7 kg (125 lb).  Medication Reconciliation: complete        Marlene Coy LPN    "

## 2024-09-19 LAB — BACTERIA UR CULT: NO GROWTH

## 2024-09-24 ENCOUNTER — OFFICE VISIT (OUTPATIENT)
Dept: OBGYN | Facility: OTHER | Age: 34
End: 2024-09-24
Attending: NURSE PRACTITIONER
Payer: COMMERCIAL

## 2024-09-24 VITALS — DIASTOLIC BLOOD PRESSURE: 70 MMHG | HEART RATE: 87 BPM | SYSTOLIC BLOOD PRESSURE: 110 MMHG

## 2024-09-24 DIAGNOSIS — B37.9 INFECTION DUE TO CANDIDA GLABRATA: ICD-10-CM

## 2024-09-24 DIAGNOSIS — N94.89 VAGINAL BURNING: Primary | ICD-10-CM

## 2024-09-24 LAB
BACTERIAL VAGINOSIS VAG-IMP: NEGATIVE
CANDIDA DNA VAG QL NAA+PROBE: NOT DETECTED
CANDIDA GLABRATA / CANDIDA KRUSEI DNA: DETECTED
T VAGINALIS DNA VAG QL NAA+PROBE: NOT DETECTED

## 2024-09-24 PROCEDURE — 0352U MULTIPLEX VAGINAL PANEL BY PCR: CPT | Mod: ZL | Performed by: NURSE PRACTITIONER

## 2024-09-24 PROCEDURE — 99213 OFFICE O/P EST LOW 20 MIN: CPT | Performed by: NURSE PRACTITIONER

## 2024-09-24 NOTE — NURSING NOTE
"Chief Complaint   Patient presents with    Vaginal Problem     Patient is here for a follow up burnign.   Initial /70   Pulse 87  Estimated body mass index is 21.46 kg/m  as calculated from the following:    Height as of 11/22/19: 1.626 m (5' 4\").    Weight as of 9/17/24: 56.7 kg (125 lb).  Medication Reconciliation: complete        Marlene Coy LPN    "

## 2024-09-24 NOTE — PROGRESS NOTES
Romelia Bhandari  1990    SUBJECTIVE  CHIEF COMPLAINT/ REASON FOR VISIT  Patient presents with:  Vaginal Problem     HISTORY OF PRESENT ILLNESS  Romelia Bhandari is a pleasant 34 year old female presents to clinic today for follow-up with ongoing concerns of burning with urination/vaginal burning prior to urination.  This is worse in the morning and evening.  She denies burning when she is actually urinating.    I saw patient initially last week on 2024 with concerns for UTI.  She had onset of urinary frequency urgency and dysuria with some lower abdominal discomfort that had started the morning of 2024.  Urinalysis returned showing moderate leukocyte esterase however urine culture was negative for infection.    Patient returns for further follow-up as symptoms continue to persist.    Denies any vaginal itching, change in vaginal discharge or change in odor.    I have reviewed the nursing notes.  I have reviewed allergies, medication list, problem list, and past medical history.    OBHx  OB History    Para Term  AB Living   2 2 2 0 0 2   SAB IAB Ectopic Multiple Live Births   0 0 0 0 2      # Outcome Date GA Lbr Dewey/2nd Weight Sex Type Anes PTL Lv   2 Term 20 39w1d  2.954 kg (6 lb 8.2 oz) F Vag-Spont INT N JONH      Name: GIANNAFEMALE-ROMELIA      Apgar1: 7  Apgar5: 9   1 Term 18 38w0d 06:45 / 00:38 2.761 kg (6 lb 1.4 oz) F Vag-Vacuum EPI N JONH      Birth Comments: head molding and bruising      Complications: Fetal Intolerance      Name: RUBA BHANDARI1 ROMELIA      Apgar1: 7  Apgar5: 8       ROS: 10-Point ROS negative except as noted in HPI    Physical Exam  /70   Pulse 87   There is no height or weight on file to calculate BMI.  Gen: Well-appearing, well developed  Resp: nonlabored, normal effort, no audible wheezing  GI: soft, nontender, no flank pain  MS: moving without difficulty  Psych: appropriate mood and affect    Pelvic:  Normal appearing external female  genitalia. Normal hair distribution. Vagina is without lesions.  No erythema of vaginal vault.  No vaginal discharge. Cervix normal, no lesions, no cervical motion tenderness.     DIAGNOSTICS  Results for orders placed or performed in visit on 09/24/24   Multiplex Vaginal Panel by PCR     Status: Abnormal    Specimen: Vagina; Swab   Result Value Ref Range    Bacterial Vaginosis Organism DNA Negative Negative    Candida Group DNA Not Detected Not Detected    Candida glabrata / Sheree krusei DNA Detected (A) Not Detected    Trichomonas vaginalis DNA Not Detected Not Detected    Narrative    The Xpert  Xpress MVP test, performed on the [employer], is an automated, qualitative in vitro diagnostic test for the detection of DNA targets from anaerobic bacteria associated with bacterial vaginosis, Candida species associated with vulvovaginal candidiasis, and Trichomonas vaginalis. The assay uses clinician-collected and self-collected vaginal swabs from patients who are symptomatic for vaginitis/ vaginosis. The Xpert  Xpress MVP test utilizes real-time polymerase chain reaction (PCR) for the amplification of specific DNA targets and utilizes fluorogenic target-specific hybridization probes to detect and differentiate DNA. It is intended to aid in the diagnosis of vaginal infections in women with a clinical presentation consistent with bacterial vaginosis, vulvovaginal candidiasis, or trichomoniasis.   The assay targets three anaerobic microorgansims that are associated with bacterial vaginosis (BV). Other organisms that are not detected by the Xpert  Xpress MVP test have also been reported to be associated with BV. The BV organism and Candida species targets of the Xpert  Xpress MVP test can be commensal in women; positive results must be considered in conjunction with other clinical and patient information to determine the disease status.        ASSESSMENT/PLAN  1. Vaginal burning  - Multiplex Vaginal  Panel by PCR   Ongoing dysuria/vaginal burning prior to urinating for the past week.  Urine culture was negative for infection.  Recommend vaginitis panel to rule out infection.  This is processing and she will be notified of results and treated appropriately once they return.     2. Infection due to Candida glabrata  - Boric Acid Vaginal 600 MG SUPP; Place 600 mg vaginally at bedtime for 14 days.  Dispense: 14 suppository; Refill: 0   Recommend boric acid vaginal suppository daily at bedtime for the next 14 days for treatment of Candida glabrata/Krusei.  Precautions given with treatment.  She will let us know if she has any persistent symptoms.  I would recommend retesting in 3 to 4 weeks to ensure infection has cleared    Explanation of diagnosis, treatment options and risk and benefits of medications reviewed with patient/caregiver. Patient/caregiver agrees with plan of care. Red flags symptoms were discussed and patient/caregiver was advised when they should return for reevaluation or for prompt emergency evaluation. Discharge Instructions were printed or are available on [employer] on electronic AVS.     Astrid Adams NP  [facility]

## 2024-09-24 NOTE — ADDENDUM NOTE
Addended by: RAVINDRA SAUCEDO on: 9/24/2024 04:37 PM     Modules accepted: Orders, Level of Service

## 2024-10-18 SDOH — HEALTH STABILITY: PHYSICAL HEALTH: ON AVERAGE, HOW MANY MINUTES DO YOU ENGAGE IN EXERCISE AT THIS LEVEL?: 30 MIN

## 2024-10-18 SDOH — HEALTH STABILITY: PHYSICAL HEALTH: ON AVERAGE, HOW MANY DAYS PER WEEK DO YOU ENGAGE IN MODERATE TO STRENUOUS EXERCISE (LIKE A BRISK WALK)?: 3 DAYS

## 2024-10-18 ASSESSMENT — SOCIAL DETERMINANTS OF HEALTH (SDOH): HOW OFTEN DO YOU GET TOGETHER WITH FRIENDS OR RELATIVES?: THREE TIMES A WEEK

## 2024-10-23 ENCOUNTER — OFFICE VISIT (OUTPATIENT)
Dept: FAMILY MEDICINE | Facility: OTHER | Age: 34
End: 2024-10-23
Attending: FAMILY MEDICINE
Payer: COMMERCIAL

## 2024-10-23 VITALS
HEIGHT: 65 IN | DIASTOLIC BLOOD PRESSURE: 66 MMHG | RESPIRATION RATE: 16 BRPM | BODY MASS INDEX: 20.2 KG/M2 | WEIGHT: 121.25 LBS | OXYGEN SATURATION: 96 % | TEMPERATURE: 97 F | HEART RATE: 60 BPM | SYSTOLIC BLOOD PRESSURE: 100 MMHG

## 2024-10-23 DIAGNOSIS — Z11.59 ENCOUNTER FOR HEPATITIS C SCREENING TEST FOR LOW RISK PATIENT: ICD-10-CM

## 2024-10-23 DIAGNOSIS — R30.0 DYSURIA: ICD-10-CM

## 2024-10-23 DIAGNOSIS — Z13.1 DIABETES MELLITUS SCREENING: ICD-10-CM

## 2024-10-23 DIAGNOSIS — Z13.220 LIPID SCREENING: ICD-10-CM

## 2024-10-23 DIAGNOSIS — Z00.00 ROUTINE HISTORY AND PHYSICAL EXAMINATION OF ADULT: Primary | ICD-10-CM

## 2024-10-23 PROBLEM — O47.00 PRETERM UTERINE CONTRACTIONS: Status: RESOLVED | Noted: 2018-04-24 | Resolved: 2024-10-23

## 2024-10-23 PROBLEM — Z34.90 ENCOUNTER FOR ELECTIVE INDUCTION OF LABOR: Status: RESOLVED | Noted: 2020-06-19 | Resolved: 2024-10-23

## 2024-10-23 LAB
ALBUMIN UR-MCNC: NEGATIVE MG/DL
ANION GAP SERPL CALCULATED.3IONS-SCNC: 9 MMOL/L (ref 7–15)
APPEARANCE UR: CLEAR
BILIRUB UR QL STRIP: NEGATIVE
BUN SERPL-MCNC: 10.1 MG/DL (ref 6–20)
CALCIUM SERPL-MCNC: 9.4 MG/DL (ref 8.8–10.4)
CHLORIDE SERPL-SCNC: 101 MMOL/L (ref 98–107)
CHOLEST SERPL-MCNC: 196 MG/DL
COLOR UR AUTO: NORMAL
CREAT SERPL-MCNC: 0.8 MG/DL (ref 0.51–0.95)
EGFRCR SERPLBLD CKD-EPI 2021: >90 ML/MIN/1.73M2
FASTING STATUS PATIENT QL REPORTED: ABNORMAL
FASTING STATUS PATIENT QL REPORTED: NORMAL
GLUCOSE SERPL-MCNC: 92 MG/DL (ref 70–99)
GLUCOSE UR STRIP-MCNC: NEGATIVE MG/DL
HCO3 SERPL-SCNC: 27 MMOL/L (ref 22–29)
HCV AB SERPL QL IA: NONREACTIVE
HDLC SERPL-MCNC: 69 MG/DL
HGB UR QL STRIP: NEGATIVE
KETONES UR STRIP-MCNC: NEGATIVE MG/DL
LDLC SERPL CALC-MCNC: 110 MG/DL
LEUKOCYTE ESTERASE UR QL STRIP: NEGATIVE
NITRATE UR QL: NEGATIVE
NONHDLC SERPL-MCNC: 127 MG/DL
PH UR STRIP: 7.5 [PH] (ref 5–9)
POTASSIUM SERPL-SCNC: 4.1 MMOL/L (ref 3.4–5.3)
SODIUM SERPL-SCNC: 137 MMOL/L (ref 135–145)
SP GR UR STRIP: 1.01 (ref 1–1.03)
TRIGL SERPL-MCNC: 87 MG/DL
UROBILINOGEN UR STRIP-MCNC: NORMAL MG/DL

## 2024-10-23 PROCEDURE — 99395 PREV VISIT EST AGE 18-39: CPT | Performed by: FAMILY MEDICINE

## 2024-10-23 PROCEDURE — 80061 LIPID PANEL: CPT | Mod: ZL | Performed by: FAMILY MEDICINE

## 2024-10-23 PROCEDURE — 99213 OFFICE O/P EST LOW 20 MIN: CPT | Mod: 25 | Performed by: FAMILY MEDICINE

## 2024-10-23 PROCEDURE — 36415 COLL VENOUS BLD VENIPUNCTURE: CPT | Mod: ZL | Performed by: FAMILY MEDICINE

## 2024-10-23 PROCEDURE — 86803 HEPATITIS C AB TEST: CPT | Mod: ZL | Performed by: FAMILY MEDICINE

## 2024-10-23 PROCEDURE — 81003 URINALYSIS AUTO W/O SCOPE: CPT | Mod: ZL | Performed by: FAMILY MEDICINE

## 2024-10-23 PROCEDURE — 80048 BASIC METABOLIC PNL TOTAL CA: CPT | Mod: ZL | Performed by: FAMILY MEDICINE

## 2024-10-23 ASSESSMENT — PAIN SCALES - GENERAL: PAINLEVEL_OUTOF10: NO PAIN (0)

## 2024-10-23 NOTE — PROGRESS NOTES
Preventive Care Visit  [facility] AND [facility]  Mena Little DO, Family Medicine  Oct 23, 2024      Assessment & Plan     1. Routine history and physical examination of adult (Primary)  - Lipid Panel; Future  - Basic Metabolic Panel; Future  - Hepatitis C Screen Reflex to HCV RNA Quant and Genotype; Future  - Lipid Panel  - Basic Metabolic Panel  - Hepatitis C Screen Reflex to HCV RNA Quant and Genotype    2. Diabetes mellitus screening  Screening labs collected today.  - Basic Metabolic Panel; Future  - Basic Metabolic Panel    3. Lipid screening  Screening labs collected today.  - Lipid Panel; Future  - Lipid Panel    4. Encounter for hepatitis C screening test for low risk patient  Screening labs collected today.  - Hepatitis C Screen Reflex to HCV RNA Quant and Genotype; Future  - Hepatitis C Screen Reflex to HCV RNA Quant and Genotype    5. Dysuria  With recent UC normal; and treatment completed for yeast vaginitis.  Symptoms persisting.  May benefit from probiotics; but currently symptoms are worse in the AM.  UA today - reassuring.  Will repeat a UA in the AM (monitoring for noctural hematuria, or other abnormality in a cyclical nature).  Refer to Urology if abnormal vs persistent symptoms - for evaluation of possible IC.  Push fluids.   Will send information re: bladder diet.  - UA reflex to Microscopic  - UA reflex to Microscopic; Future  - UA reflex to Microscopic; Future    Patient has been advised of split billing requirements and indicates understanding: Yes    Counseling  Appropriate preventive services were addressed with this patient via screening, questionnaire, or discussion as appropriate for fall prevention, nutrition, physical activity, Tobacco-use cessation, social engagement, weight loss and cognition.  Checklist reviewing preventive services available has been given to the patient.  Reviewed patient's diet, addressing concerns and/or questions.   She is at risk for lack of  exercise and has been provided with information to increase physical activity for the benefit of her well-being.   She is at risk for psychosocial distress and has been provided with information to reduce risk.     Follow up: pending UA.      Alesha León is a 34 year old, presenting for the following:  Physical        10/23/2024    10:12 AM   Additional Questions   Roomed by TITI Day   Accompanied by self          HPI    Has been seen a couple times in women's health department for dysuria/vaginitis.  Feels like annoying discomfort in her urethra.  Has had a LEEP procedure in the past; no other bladder sling/vaginal interventions.  Has had a catheter - only once with labor/epidural.  In short time before she was complete and needed to push.  AM is worse; urine appears more jazzy/concentrated but no abnormal colors or odors.  Throughout the day, symptoms improve.    Can get a similar sensation with intercourse.  Urinates before and afterward and has for quite some time.      Health Care Directive  Patient does not have a Health Care Directive: Discussed advance care planning with patient; information given to patient to review.      10/18/2024   General Health   How would you rate your overall physical health? Good   Feel stress (tense, anxious, or unable to sleep) Only a little      (!) STRESS CONCERN      10/18/2024   Nutrition   Three or more servings of calcium each day? Yes   Diet: Regular (no restrictions)   How many servings of fruit and vegetables per day? (!) 2-3   How many sweetened beverages each day? 0-1            10/18/2024   Exercise   Days per week of moderate/strenous exercise 3 days   Average minutes spent exercising at this level 30 min            10/18/2024   Social Factors   Frequency of gathering with friends or relatives Three times a week   Worry food won't last until get money to buy more No   Food not last or not have enough money for food? No   Do you have housing? (Housing is  defined as stable permanent housing and does not include staying ouside in a car, in a tent, in an abandoned building, in an overnight shelter, or couch-surfing.) Yes   Are you worried about losing your housing? No   Lack of transportation? No   Unable to get utilities (heat,electricity)? No            10/18/2024   Dental   Dentist two times every year? Yes            10/18/2024   TB Screening   Were you born outside of the [place]? No          Today's PHQ-2 Score:       10/23/2024    10:07 AM   PHQ-2 ( 1999 [employer])   Q1: Little interest or pleasure in doing things 1    Q2: Feeling down, depressed or hopeless 0    PHQ-2 Score 1    Q1: Little interest or pleasure in doing things Several days   Q2: Feeling down, depressed or hopeless Not at all   PHQ-2 Score 1       Patient-reported           10/18/2024   Substance Use   Alcohol more than 3/day or more than 7/wk No   Do you use any other substances recreationally? No        Social History     Tobacco Use    Smoking status: Never    Smokeless tobacco: Never   Vaping Use    Vaping status: Never Used   Substance Use Topics    Alcohol use: Yes     Alcohol/week: 2.0 standard drinks of alcohol     Types: 2 Standard drinks or equivalent per week     Comment: 1-2 times a week    Drug use: Never     Comment: Drug use: No                  10/18/2024   STI Screening   New sexual partner(s) since last STI/HIV test? No        History of abnormal Pap smear: No - age 30- 64 PAP with HPV every 5 years recommended        7/16/2021     2:25 PM 6/29/2018    11:27 AM   PAP / HPV   PAP Negative for Intraepithelial Lesion or Malignancy (NILM)     HPV 16 DNA Negative  Negative    HPV 18 DNA Negative  Negative    Other HR HPV Negative  Negative            10/18/2024   Contraception/Family Planning   Questions about contraception or family planning No           Reviewed and updated as needed this visit by Provider                  Patient's last menstrual period was 10/10/2024 (exact date).    Contraception:  vasectomy.  Risk for STI?: No  Last pap: 21, neg co-testing.  5yr.  Any hx of abnormal paps:  No  FH of early CA?: No  Cholesterol/DM concerns/screening: Lipids never; normal glucose testing in pregnancy.  Tobacco?: NA; never  Calcium intake: No  DEXA: NA  Last mammo: NA/age  Colon cancer screening: NA/age  Immunizations: Tdap 3/20/2020; recommend flu yearly - work; Shingrix @ 50; RSV @ 60; PNA @ 65.  Hep A?  Hep B?  HPV?  Menactra?  Has immunity to Varicella (titer).  Covid x3 complete; candidate for booster.    Past Medical History:   Diagnosis Date    H/O abnormal cervical Papanicolaou smear     H/O LEEP      Past Surgical History:   Procedure Laterality Date    EXTRACTION(S) DENTAL      age 16,no anesthetic problems     OB History    Para Term  AB Living   2 2 2 0 0 2   SAB IAB Ectopic Multiple Live Births   0 0 0 0 2      # Outcome Date GA Lbr Dewey/2nd Weight Sex Type Anes PTL Lv   2 Term 20 39w1d  2.954 kg (6 lb 8.2 oz) F Vag-Spont INT N JONH      Name: GIANNA,FEMALE-ILENE      Apgar1: 7  Apgar5: 9   1 Term 18 38w0d 06:45 / 00:38 2.761 kg (6 lb 1.4 oz) F Vag-Vacuum EPI N JONH      Birth Comments: head molding and bruising      Complications: Fetal Intolerance      Name: GIANNABABY1 ILENE      Apgar1: 7  Apgar5: 8     BP Readings from Last 3 Encounters:   10/23/24 100/66   24 110/70   24 100/60    Wt Readings from Last 3 Encounters:   10/23/24 55 kg (121 lb 4 oz)   24 56.7 kg (125 lb)   23 54.4 kg (120 lb)                  Patient Active Problem List   Diagnosis    Encounter for elective induction of labor     (spontaneous vaginal delivery)    Lower abdominal pain     uterine contractions     Past Surgical History:   Procedure Laterality Date    EXTRACTION(S) DENTAL      age 16,no anesthetic problems       Social History     Tobacco Use    Smoking status: Never    Smokeless tobacco: Never   Substance Use Topics     "Alcohol use: Yes     Alcohol/week: 2.0 standard drinks of alcohol     Types: 2 Standard drinks or equivalent per week     Comment: 1-2 times a week     Family History   Problem Relation Age of Onset    No Known Problems Mother     No Known Problems Father     Cerebrovascular Disease Paternal Grandfather         stroke    No Known Problems Daughter     No Known Problems Daughter          Current Outpatient Medications   Medication Sig Dispense Refill    albuterol (PROAIR HFA/PROVENTIL HFA/VENTOLIN HFA) 108 (90 BASE) MCG/ACT Inhaler Inhale 2 puffs into the lungs 4 times daily as needed for wheezing (Patient not taking: Reported on 8/18/2022)       Allergies   Allergen Reactions    Penicillin G Rash    Penicillins Rash        Objective    Exam  /66   Pulse 60   Temp 97  F (36.1  C) (Tympanic)   Resp 16   Ht 1.645 m (5' 4.75\")   Wt 55 kg (121 lb 4 oz)   LMP 10/10/2024 (Exact Date)   SpO2 96%   BMI 20.33 kg/m     Estimated body mass index is 20.33 kg/m  as calculated from the following:    Height as of this encounter: 1.645 m (5' 4.75\").    Weight as of this encounter: 55 kg (121 lb 4 oz).    Physical Exam  GENERAL: alert and no distress  EYES: Eyes grossly normal to inspection, PERRL and conjunctivae and sclerae normal  HENT: ear canals and TM's normal, nose and mouth without ulcers or lesions  NECK: no adenopathy, no asymmetry, masses, or scars  RESP: lungs clear to auscultation - no rales, rhonchi or wheezes  CV: regular rate and rhythm, normal S1 S2, no S3 or S4, no murmur, click or rub, no peripheral edema  MS: no gross musculoskeletal defects noted, no edema.  No flank/CVA tenderness.  SKIN: no suspicious lesions or rashes  PSYCH: mentation appears normal, affect normal/bright        Signed Electronically by: Mena Little DO    "

## 2024-10-23 NOTE — NURSING NOTE
"Chief Complaint   Patient presents with    Physical       Initial /66   Pulse 60   Temp 97  F (36.1  C) (Tympanic)   Resp 16   Ht 1.645 m (5' 4.75\")   Wt 55 kg (121 lb 4 oz)   LMP 10/10/2024 (Exact Date)   SpO2 96%   BMI 20.33 kg/m   Estimated body mass index is 20.33 kg/m  as calculated from the following:    Height as of this encounter: 1.645 m (5' 4.75\").    Weight as of this encounter: 55 kg (121 lb 4 oz).  Medication Review: complete    The next two questions are to help us understand your food security.  If you are feeling you need any assistance in this area, we have resources available to support you today.          10/18/2024   SDOH- Food Insecurity   Within the past 12 months, did you worry that your food would run out before you got money to buy more? N    Within the past 12 months, did the food you bought just not last and you didn t have money to get more? N        Patient-reported         Health Care Directive:  Patient does not have a Health Care Directive: Discussed advance care planning with patient; information given to patient to review.    Barb Storm LPN      "

## 2024-10-24 ENCOUNTER — LAB (OUTPATIENT)
Dept: LAB | Facility: OTHER | Age: 34
End: 2024-10-24
Payer: COMMERCIAL

## 2024-10-24 DIAGNOSIS — R30.0 DYSURIA: ICD-10-CM

## 2024-10-24 LAB
ALBUMIN UR-MCNC: NEGATIVE MG/DL
APPEARANCE UR: CLEAR
BILIRUB UR QL STRIP: NEGATIVE
COLOR UR AUTO: ABNORMAL
GLUCOSE UR STRIP-MCNC: NEGATIVE MG/DL
HGB UR QL STRIP: NEGATIVE
KETONES UR STRIP-MCNC: ABNORMAL MG/DL
LEUKOCYTE ESTERASE UR QL STRIP: NEGATIVE
NITRATE UR QL: NEGATIVE
PH UR STRIP: 6.5 [PH] (ref 5–9)
SP GR UR STRIP: 1.02 (ref 1–1.03)
UROBILINOGEN UR STRIP-MCNC: NORMAL MG/DL

## 2024-10-24 PROCEDURE — 81003 URINALYSIS AUTO W/O SCOPE: CPT | Mod: ZL

## 2024-11-20 ENCOUNTER — E-VISIT (OUTPATIENT)
Dept: URGENT CARE | Facility: CLINIC | Age: 34
End: 2024-11-20
Payer: COMMERCIAL

## 2024-11-20 DIAGNOSIS — J02.0 ACUTE STREPTOCOCCAL PHARYNGITIS: Primary | ICD-10-CM

## 2024-11-20 DIAGNOSIS — J02.9 SORE THROAT: ICD-10-CM

## 2024-11-20 RX ORDER — CEPHALEXIN 500 MG/1
500 CAPSULE ORAL 2 TIMES DAILY
Qty: 20 CAPSULE | Refills: 0 | Status: SHIPPED | OUTPATIENT
Start: 2024-11-20 | End: 2024-11-30

## 2024-11-20 NOTE — PATIENT INSTRUCTIONS
Dear Romelia,    After reviewing your responses, I would like you to come in for a swab to make sure we treat you correctly. This swab is to evaluate you for possible Strep Throat, and should be scheduled for today or tomorrow. Please use the Schedule Now button in [employer] to schedule your swab. Otherwise, click this link to schedule a lab only appointment.    Lab appointments are not available at most locations on the weekends. If no Lab Only appointment is available, you should be seen in any of our convenient Urgent Care Centers for an in person visit, which can be found on our website here.    You will receive instructions with your results in [employer] once they are available.     If your symptoms worsen, you develop difficulty breathing, difficulty with drinking enough to stay hydrated, difficulty swallowing your saliva or have fevers for more than 5 days, please contact your primary care provider for an appointment or visit an Urgent Care Center to be seen.      Thanks again for choosing us as your health care partner.   Teresa Morgan PA-C  Sore Throat: Care Instructions  Overview     Infection by bacteria or a virus causes most sore throats. Cigarette smoke, dry air, air pollution, allergies, and yelling can also cause a sore throat. Sore throats can be painful and annoying. Fortunately, most sore throats go away on their own. If you have a bacterial infection, your doctor may prescribe antibiotics.  Follow-up care is a key part of your treatment and safety. Be sure to make and go to all appointments, and call your doctor if you are having problems. It's also a good idea to know your test results and keep a list of the medicines you take.  How can you care for yourself at home?  If your doctor prescribed antibiotics, take them as directed. Do not stop taking them just because you feel better. You need to take the full course of antibiotics.  Gargle with warm salt water several times a day to help reduce  "swelling and relieve pain. Mix 1/2 teaspoon of salt in 1 cup of warm water.  Take an over-the-counter pain medicine, such as acetaminophen (Tylenol), ibuprofen (Advil, Motrin), or naproxen (Aleve). Read and follow all instructions on the label.  Be careful when taking over-the-counter cold or flu medicines and Tylenol at the same time. Many of these medicines have acetaminophen, which is Tylenol. Read the labels to make sure that you are not taking more than the recommended dose. Too much acetaminophen (Tylenol) can be harmful.  Drink plenty of fluids. Fluids may help soothe an irritated throat. Hot fluids, such as tea or soup, may help decrease throat pain.  Use over-the-counter throat lozenges to soothe pain. Regular cough drops or hard candy may also help. These should not be given to young children because of the risk of choking.  Do not smoke or allow others to smoke around you. If you need help quitting, talk to your doctor about stop-smoking programs and medicines. These can increase your chances of quitting for good.  Use a vaporizer or humidifier to add moisture to your bedroom. Follow the directions for cleaning the machine.  When should you call for help?   Call your doctor now or seek immediate medical care if:    You have trouble breathing.     Your sore throat gets much worse on one side.     You have new or worse trouble swallowing.     You have a new or higher fever.   Watch closely for changes in your health, and be sure to contact your doctor if you do not get better as expected.  Where can you learn more?  Go to https://www.[employer].net/patiented  Enter U420 in the search box to learn more about \"Sore Throat: Care Instructions.\"  Current as of: September 27, 2023  Content Version: 14.2 2024 [employer] [facility] [employer]   Care instructions adapted under license by your healthcare professional. If you have questions about a medical condition or this instruction, always ask your healthcare " professional. [employer] disclaims any warranty or liability for your use of this information.